# Patient Record
Sex: FEMALE | Race: WHITE | Employment: FULL TIME | URBAN - METROPOLITAN AREA
[De-identification: names, ages, dates, MRNs, and addresses within clinical notes are randomized per-mention and may not be internally consistent; named-entity substitution may affect disease eponyms.]

---

## 2019-10-02 LAB
ALBUMIN: 4 G/DL (ref 3.4–5)
ALP BLD-CCNC: 56 U/L (ref 33–110)
ALT SERPL-CCNC: 14 U/L (ref 7–45)
ANION GAP SERPL CALCULATED.3IONS-SCNC: 13 MMOL/L (ref 10–20)
AST SERPL-CCNC: 13 U/L (ref 9–39)
BICARBONATE: 23 MMOL/L (ref 21–32)
BILIRUB SERPL-MCNC: 0.4 MG/DL (ref 0–1.2)
BILIRUBIN DIRECT: 0.1 MG/DL (ref 0–0.3)
CALCIUM SERPL-MCNC: 9 MG/DL (ref 8.6–10.3)
CHLORIDE BLD-SCNC: 103 MMOL/L (ref 98–107)
CHOLESTEROL/HDL RATIO: 5.2
CHOLESTEROL: 177 MG/DL (ref 0–199)
CREAT SERPL-MCNC: 0.87 MG/DL (ref 0.5–1)
ERYTHROCYTE [DISTWIDTH] IN BLOOD BY AUTOMATED COUNT: 18 % (ref 11.5–14)
ESTIMATED AVERAGE GLUCOSE: 105 MG/DL
GFR AFRICAN AMERICAN: >60 ML/MIN/1.73M2
GFR NON-AFRICAN AMERICAN: >60 ML/MIN/1.73M2
GLUCOSE: 82 MG/DL (ref 74–99)
HBA1C MFR BLD: 5.3 %
HCT VFR BLD CALC: 34.7 % (ref 36–46)
HDLC SERPL-MCNC: 34 MG/DL
HEMOGLOBIN: 10.1 G/DL (ref 12–16)
LDL CHOLESTEROL: 89 MG/DL (ref 0–99)
MAGNESIUM: 1.9 MG/DL (ref 1.6–2.4)
MCHC RBC AUTO-ENTMCNC: 29.1 G/DL (ref 32–36)
MCV RBC AUTO: 78 FL (ref 80–100)
NON-HDL CHOLESTEROL: 143 MG/DL
PLATELET # BLD: 314 X10E9/L (ref 150–450)
POTASSIUM SERPL-SCNC: 4.3 MMOL/L (ref 3.5–5.3)
RBC # BLD: 4.44 X10E12/L (ref 4–5.2)
SODIUM BLD-SCNC: 135 MMOL/L (ref 136–145)
TOTAL PROTEIN: 7.3 G/DL (ref 6.4–8.2)
TRIGL SERPL-MCNC: 270 MG/DL (ref 0–149)
UREA NITROGEN: 12 MG/DL (ref 6–23)
VLDLC SERPL CALC-MCNC: 54 MG/DL (ref 0–40)
WBC: 7.9 X10E9/L (ref 4.4–11.3)

## 2021-06-23 ENCOUNTER — HOSPITAL ENCOUNTER (OUTPATIENT)
Dept: WOMENS IMAGING | Age: 46
Discharge: HOME | End: 2021-06-25
Payer: MEDICARE

## 2021-06-23 ENCOUNTER — HOSPITAL ENCOUNTER (OUTPATIENT)
Dept: ULTRASOUND IMAGING | Age: 46
Discharge: HOME | End: 2021-06-25
Payer: MEDICARE

## 2021-06-23 DIAGNOSIS — Z12.31 SCREENING MAMMOGRAM FOR HIGH-RISK PATIENT: ICD-10-CM

## 2021-06-23 DIAGNOSIS — N92.0 MENORRHAGIA WITH REGULAR CYCLE: ICD-10-CM

## 2021-06-23 PROCEDURE — 76856 US EXAM PELVIC COMPLETE: CPT

## 2021-06-23 PROCEDURE — 76830 TRANSVAGINAL US NON-OB: CPT

## 2021-06-23 PROCEDURE — 77063 BREAST TOMOSYNTHESIS BI: CPT

## 2021-10-15 ENCOUNTER — HOSPITAL ENCOUNTER (OUTPATIENT)
Dept: WOMENS IMAGING | Age: 46
Discharge: HOME | End: 2021-10-17
Payer: MEDICARE

## 2021-10-15 ENCOUNTER — APPOINTMENT (OUTPATIENT)
Dept: ULTRASOUND IMAGING | Age: 46
End: 2021-10-15
Payer: MEDICARE

## 2021-10-15 DIAGNOSIS — R92.8 ABNORMAL MAMMOGRAM: ICD-10-CM

## 2021-10-15 PROCEDURE — G0279 TOMOSYNTHESIS, MAMMO: HCPCS

## 2022-01-17 ENCOUNTER — HOSPITAL ENCOUNTER (INPATIENT)
Age: 47
LOS: 3 days | Discharge: HOME | DRG: 751 | End: 2022-01-20
Attending: PSYCHIATRY & NEUROLOGY | Admitting: PSYCHIATRY & NEUROLOGY
Payer: MEDICARE

## 2022-01-17 DIAGNOSIS — T14.91XA SUICIDE ATTEMPT (HCC): Primary | ICD-10-CM

## 2022-01-17 DIAGNOSIS — S61.512A WRIST LACERATION, LEFT, INITIAL ENCOUNTER: ICD-10-CM

## 2022-01-17 PROBLEM — F32.9 MAJOR DEPRESSION, SINGLE EPISODE: Status: ACTIVE | Noted: 2022-01-17

## 2022-01-17 LAB
ACETAMINOPHEN LEVEL: <5 UG/ML (ref 10–30)
ALBUMIN SERPL-MCNC: 4.1 G/DL (ref 3.5–4.6)
ALP BLD-CCNC: 63 U/L (ref 40–130)
ALT SERPL-CCNC: 19 U/L (ref 0–33)
AMPHETAMINE SCREEN, URINE: NORMAL
ANION GAP SERPL CALCULATED.3IONS-SCNC: 10 MEQ/L (ref 9–15)
AST SERPL-CCNC: 20 U/L (ref 0–35)
BARBITURATE SCREEN URINE: NORMAL
BASOPHILS ABSOLUTE: 0 K/UL (ref 0–0.2)
BASOPHILS RELATIVE PERCENT: 0.6 %
BENZODIAZEPINE SCREEN, URINE: NORMAL
BILIRUB SERPL-MCNC: 0.3 MG/DL (ref 0.2–0.7)
BILIRUBIN URINE: NEGATIVE
BLOOD, URINE: NEGATIVE
BUN BLDV-MCNC: 9 MG/DL (ref 6–20)
CALCIUM SERPL-MCNC: 9.6 MG/DL (ref 8.5–9.9)
CANNABINOID SCREEN URINE: NORMAL
CHLORIDE BLD-SCNC: 102 MEQ/L (ref 95–107)
CHOLESTEROL, TOTAL: 145 MG/DL (ref 0–199)
CLARITY: CLEAR
CO2: 25 MEQ/L (ref 20–31)
COCAINE METABOLITE SCREEN URINE: NORMAL
COLOR: YELLOW
CREAT SERPL-MCNC: 0.68 MG/DL (ref 0.5–0.9)
EOSINOPHILS ABSOLUTE: 0.1 K/UL (ref 0–0.7)
EOSINOPHILS RELATIVE PERCENT: 1.3 %
ETHANOL PERCENT: NORMAL G/DL
ETHANOL: <10 MG/DL (ref 0–0.08)
GFR AFRICAN AMERICAN: >60
GFR NON-AFRICAN AMERICAN: >60
GLOBULIN: 3.3 G/DL (ref 2.3–3.5)
GLUCOSE BLD-MCNC: 101 MG/DL (ref 70–99)
GLUCOSE URINE: NEGATIVE MG/DL
HCT VFR BLD CALC: 35.3 % (ref 37–47)
HDLC SERPL-MCNC: 43 MG/DL (ref 40–59)
HEMOGLOBIN: 11.6 G/DL (ref 12–16)
KETONES, URINE: ABNORMAL MG/DL
LDL CHOLESTEROL CALCULATED: 76 MG/DL (ref 0–129)
LEUKOCYTE ESTERASE, URINE: NEGATIVE
LYMPHOCYTES ABSOLUTE: 1.7 K/UL (ref 1–4.8)
LYMPHOCYTES RELATIVE PERCENT: 25.3 %
Lab: NORMAL
MCH RBC QN AUTO: 27.7 PG (ref 27–31.3)
MCHC RBC AUTO-ENTMCNC: 32.9 % (ref 33–37)
MCV RBC AUTO: 84.3 FL (ref 82–100)
METHADONE SCREEN, URINE: NORMAL
MONOCYTES ABSOLUTE: 0.4 K/UL (ref 0.2–0.8)
MONOCYTES RELATIVE PERCENT: 5.7 %
NEUTROPHILS ABSOLUTE: 4.5 K/UL (ref 1.4–6.5)
NEUTROPHILS RELATIVE PERCENT: 67.1 %
NITRITE, URINE: NEGATIVE
OPIATE SCREEN URINE: NORMAL
OXYCODONE URINE: NORMAL
PDW BLD-RTO: 13.8 % (ref 11.5–14.5)
PH UA: 6.5 (ref 5–9)
PHENCYCLIDINE SCREEN URINE: NORMAL
PLATELET # BLD: 244 K/UL (ref 130–400)
POTASSIUM SERPL-SCNC: 3.6 MEQ/L (ref 3.4–4.9)
PROPOXYPHENE SCREEN: NORMAL
PROTEIN UA: NEGATIVE MG/DL
RBC # BLD: 4.18 M/UL (ref 4.2–5.4)
SALICYLATE, SERUM: <0.3 MG/DL (ref 15–30)
SARS-COV-2, NAAT: NOT DETECTED
SODIUM BLD-SCNC: 137 MEQ/L (ref 135–144)
SPECIFIC GRAVITY UA: 1.01 (ref 1–1.03)
TOTAL CK: 58 U/L (ref 0–170)
TOTAL PROTEIN: 7.4 G/DL (ref 6.3–8)
TRIGL SERPL-MCNC: 130 MG/DL (ref 0–150)
TSH SERPL DL<=0.05 MIU/L-ACNC: 1.16 UIU/ML (ref 0.44–3.86)
URINE REFLEX TO CULTURE: ABNORMAL
UROBILINOGEN, URINE: 1 E.U./DL
WBC # BLD: 6.7 K/UL (ref 4.8–10.8)

## 2022-01-17 PROCEDURE — 87635 SARS-COV-2 COVID-19 AMP PRB: CPT

## 2022-01-17 PROCEDURE — 82550 ASSAY OF CK (CPK): CPT

## 2022-01-17 PROCEDURE — 82077 ASSAY SPEC XCP UR&BREATH IA: CPT

## 2022-01-17 PROCEDURE — 80307 DRUG TEST PRSMV CHEM ANLYZR: CPT

## 2022-01-17 PROCEDURE — 6370000000 HC RX 637 (ALT 250 FOR IP): Performed by: PHYSICIAN ASSISTANT

## 2022-01-17 PROCEDURE — 99285 EMERGENCY DEPT VISIT HI MDM: CPT

## 2022-01-17 PROCEDURE — 85025 COMPLETE CBC W/AUTO DIFF WBC: CPT

## 2022-01-17 PROCEDURE — 84443 ASSAY THYROID STIM HORMONE: CPT

## 2022-01-17 PROCEDURE — 80061 LIPID PANEL: CPT

## 2022-01-17 PROCEDURE — 1240000000 HC EMOTIONAL WELLNESS R&B

## 2022-01-17 PROCEDURE — 80053 COMPREHEN METABOLIC PANEL: CPT

## 2022-01-17 PROCEDURE — 0HQEXZZ REPAIR LEFT LOWER ARM SKIN, EXTERNAL APPROACH: ICD-10-PCS | Performed by: PSYCHIATRY & NEUROLOGY

## 2022-01-17 PROCEDURE — 6370000000 HC RX 637 (ALT 250 FOR IP): Performed by: PSYCHIATRY & NEUROLOGY

## 2022-01-17 PROCEDURE — 80179 DRUG ASSAY SALICYLATE: CPT

## 2022-01-17 PROCEDURE — 36415 COLL VENOUS BLD VENIPUNCTURE: CPT

## 2022-01-17 PROCEDURE — 80143 DRUG ASSAY ACETAMINOPHEN: CPT

## 2022-01-17 PROCEDURE — 81003 URINALYSIS AUTO W/O SCOPE: CPT

## 2022-01-17 RX ORDER — IBUPROFEN 800 MG/1
800 TABLET ORAL ONCE
Status: COMPLETED | OUTPATIENT
Start: 2022-01-17 | End: 2022-01-17

## 2022-01-17 RX ORDER — HYDROXYZINE PAMOATE 50 MG/1
50 CAPSULE ORAL EVERY 6 HOURS PRN
Status: DISCONTINUED | OUTPATIENT
Start: 2022-01-17 | End: 2022-01-20 | Stop reason: HOSPADM

## 2022-01-17 RX ORDER — HYDROXYZINE HYDROCHLORIDE 50 MG/ML
50 INJECTION, SOLUTION INTRAMUSCULAR EVERY 6 HOURS PRN
Status: DISCONTINUED | OUTPATIENT
Start: 2022-01-17 | End: 2022-01-20 | Stop reason: HOSPADM

## 2022-01-17 RX ORDER — ACETAMINOPHEN 325 MG/1
650 TABLET ORAL EVERY 4 HOURS PRN
Status: DISCONTINUED | OUTPATIENT
Start: 2022-01-17 | End: 2022-01-20 | Stop reason: HOSPADM

## 2022-01-17 RX ORDER — POLYETHYLENE GLYCOL 3350 17 G/17G
17 POWDER, FOR SOLUTION ORAL DAILY PRN
Status: DISCONTINUED | OUTPATIENT
Start: 2022-01-17 | End: 2022-01-20 | Stop reason: HOSPADM

## 2022-01-17 RX ORDER — HALOPERIDOL 5 MG/ML
5 INJECTION INTRAMUSCULAR EVERY 6 HOURS PRN
Status: DISCONTINUED | OUTPATIENT
Start: 2022-01-17 | End: 2022-01-20 | Stop reason: HOSPADM

## 2022-01-17 RX ORDER — HALOPERIDOL 5 MG
5 TABLET ORAL EVERY 6 HOURS PRN
Status: DISCONTINUED | OUTPATIENT
Start: 2022-01-17 | End: 2022-01-20 | Stop reason: HOSPADM

## 2022-01-17 RX ORDER — TRAZODONE HYDROCHLORIDE 50 MG/1
50 TABLET ORAL NIGHTLY PRN
Status: DISCONTINUED | OUTPATIENT
Start: 2022-01-17 | End: 2022-01-20 | Stop reason: HOSPADM

## 2022-01-17 RX ADMIN — IBUPROFEN 800 MG: 800 TABLET, FILM COATED ORAL at 17:57

## 2022-01-17 RX ADMIN — TRAZODONE HYDROCHLORIDE 50 MG: 50 TABLET ORAL at 22:14

## 2022-01-17 ASSESSMENT — PATIENT HEALTH QUESTIONNAIRE - PHQ9
SUM OF ALL RESPONSES TO PHQ QUESTIONS 1-9: 23
SUM OF ALL RESPONSES TO PHQ QUESTIONS 1-9: 24

## 2022-01-17 ASSESSMENT — SLEEP AND FATIGUE QUESTIONNAIRES
DIFFICULTY FALLING ASLEEP: YES
DIFFICULTY ARISING: NO
DO YOU HAVE DIFFICULTY SLEEPING: YES
AVERAGE NUMBER OF SLEEP HOURS: 4
DIFFICULTY STAYING ASLEEP: YES
RESTFUL SLEEP: YES
DO YOU USE A SLEEP AID: NO

## 2022-01-17 ASSESSMENT — ENCOUNTER SYMPTOMS
COLOR CHANGE: 0
SHORTNESS OF BREATH: 0
ABDOMINAL DISTENTION: 0
COUGH: 0
CHOKING: 0
RHINORRHEA: 0
EYE DISCHARGE: 0
SORE THROAT: 0
ABDOMINAL PAIN: 0
CONSTIPATION: 0

## 2022-01-17 ASSESSMENT — LIFESTYLE VARIABLES: HISTORY_ALCOHOL_USE: NO

## 2022-01-17 ASSESSMENT — PAIN SCALES - GENERAL
PAINLEVEL_OUTOF10: 9
PAINLEVEL_OUTOF10: 9

## 2022-01-17 NOTE — ED PROVIDER NOTES
[facility]  eMERGENCY dEPARTMENT eNCOUnter      Pt Name: Allison Jordan  MRN: [id number]  [place] 1975  Date of evaluation: 1/17/2022  Provider: Lyn Tay PA-C    CHIEF COMPLAINT       Chief Complaint   Patient presents with    Suicidal     patient sliced left wrist attempted suicide. HISTORY OF PRESENT ILLNESS   (Location/Symptom, Timing/Onset,Context/Setting, Quality, Duration, Modifying Factors, Severity)  Note limiting factors. Allison Jordan is a 42 y.o. female who presents to the emergency department with a complaint of suicide attempt. Per Oliverio please officer patient's  came home, found her with a razor knife, she had cut her left wrist.  She states that she has been under a lot of stress lately, but would not elaborate.  took a towel placed around her wrist, and contacted 911. Patient denies any other coingestions, or any other attempts at harming herself. Patient pink slipped by [facility]    HPI    NursingNotes were reviewed. REVIEW OF SYSTEMS    (2-9 systems for level 4, 10 or more for level 5)     Review of Systems   Constitutional: Negative for activity change, appetite change, fatigue and fever. HENT: Negative for congestion, ear discharge, ear pain, nosebleeds, rhinorrhea and sore throat. Eyes: Negative for discharge. Respiratory: Negative for cough, choking and shortness of breath. Cardiovascular: Negative for chest pain, palpitations and leg swelling. Gastrointestinal: Negative for abdominal distention, abdominal pain and constipation. Genitourinary: Negative for difficulty urinating and dysuria. Musculoskeletal: Negative for arthralgias. Skin: Negative for color change, pallor, rash and wound. Neurological: Negative for dizziness, syncope, numbness and headaches. Psychiatric/Behavioral: Positive for suicidal ideas. Negative for agitation and confusion.        Except as noted above the remainder of the review of systems was reviewed and negative.        PAST MEDICAL HISTORY     Past Medical History:   Diagnosis Date    Chronic back pain     Ectopic pregnancy     2002    Major depression, single episode 2022         SURGICALHISTORY       Past Surgical History:   Procedure Laterality Date    LAPAROSCOPY      Ruptured ectopic right/ right salpingectomy 2002    TUBAL LIGATION           CURRENT MEDICATIONS       Current Discharge Medication List      CONTINUE these medications which have NOT CHANGED    Details   ferrous sulfate (FE TABS) 325 (65 FE) MG EC tablet Take 1 tablet by mouth daily (with breakfast)  Qty: 90 tablet, Refills: 3      medroxyPROGESTERone (PROVERA) 10 MG tablet Take 1 tablet by mouth daily  Qty: 30 tablet, Refills: 0             ALLERGIES     Codeine    FAMILY HISTORY       Family History   Problem Relation Age of Onset    Heart Disease Mother     High Blood Pressure Mother     Diabetes Mother     Kidney Disease Mother     Cancer Father     Breast Cancer Neg Hx     Colon Cancer Neg Hx     Eclampsia Neg Hx     Hypertension Neg Hx     Ovarian Cancer Neg Hx      Labor Neg Hx     Spont Abortions Neg Hx     Stroke Neg Hx           SOCIAL HISTORY       Social History     Socioeconomic History    Marital status:      Spouse name: None    Number of children: None    Years of education: None    Highest education level: None   Occupational History    None   Tobacco Use    Smoking status: Never Smoker    Smokeless tobacco: None   Substance and Sexual Activity    Alcohol use: No     Alcohol/week: 0.0 standard drinks    Drug use: None    Sexual activity: Yes     Partners: Male     Comment: Tubal ligation w/ cs   Other Topics Concern    None   Social History Narrative    None     Social Determinants of Health     Financial Resource Strain:     Difficulty of Paying Living Expenses: Not on file   Food Insecurity:     Worried About Running Out of Food in the Last Year: Not on file    [address] in the Last Year: Not on file   Transportation Needs:     Lack of Transportation (Medical): Not on file    Lack of Transportation (Non-Medical): Not on file   Physical Activity:     Days of Exercise per Week: Not on file    Minutes of Exercise per Session: Not on file   Stress:     Feeling of Stress : Not on file   Social Connections:     Frequency of Communication with Friends and Family: Not on file    Frequency of Social Gatherings with Friends and Family: Not on file    Attends Yazidi Services: Not on file    Active Member of [address] [place] [address] [employer] or Organizations: Not on file    Attends Club or Organization Meetings: Not on file    Marital Status: Not on file   Intimate Partner Violence:     Fear of Current or Ex-Partner: Not on file    Emotionally Abused: Not on file    Physically Abused: Not on file    Sexually Abused: Not on file   Housing Stability:     Unable to Pay for Housing in the Last Year: Not on file    Number of [place] in the Last Year: Not on file    Unstable Housing in the Last Year: Not on file       SCREENINGS    Brianna Coma Scale  Eye Opening: Spontaneous  Best Verbal Response: Oriented  Best Motor Response: Obeys commands  Brianna Coma Scale Score: 15 @FLOW([id number])@      PHYSICAL EXAM    (up to 7 for level 4, 8 or more for level 5)     ED Triage Vitals [01/17/22 1214]   BP Temp Temp src Pulse Resp SpO2 Height Weight   128/87 98.1 °F (36.7 °C) -- 80 16 99 % -- --       Physical Exam  Vitals and nursing note reviewed. Constitutional:       General: She is not in acute distress. Appearance: She is well-developed. She is not ill-appearing, toxic-appearing or diaphoretic. HENT:      Head: Normocephalic. Right Ear: Tympanic membrane normal.      Left Ear: Tympanic membrane normal.      Nose: No congestion. Mouth/Throat:      Mouth: Mucous membranes are moist.      Pharynx: No oropharyngeal exudate or posterior oropharyngeal erythema. Eyes:      Extraocular Movements: Extraocular movements intact. Conjunctiva/sclera: Conjunctivae normal.      Pupils: Pupils are equal, round, and reactive to light. Neck:      Vascular: No JVD. Trachea: No tracheal deviation. Cardiovascular:      Rate and Rhythm: Normal rate. Pulses: Normal pulses. Heart sounds: Normal heart sounds. No murmur heard. No friction rub. No gallop. Pulmonary:      Effort: Pulmonary effort is normal. No tachypnea, accessory muscle usage, respiratory distress or retractions. Breath sounds: No stridor. No wheezing, rhonchi or rales. Chest:      Chest wall: No tenderness. Abdominal:      General: Abdomen is flat. Bowel sounds are normal. There is no distension or abdominal bruit. Palpations: There is no shifting dullness, fluid wave, hepatomegaly, splenomegaly, mass or pulsatile mass. Tenderness: There is no abdominal tenderness. There is no right CVA tenderness, left CVA tenderness, guarding or rebound. Negative signs include Hernandez's sign, Rovsing's sign and McBurney's sign. Musculoskeletal:         General: No deformity. Cervical back: Normal range of motion and neck supple. No rigidity. Skin:     General: Skin is warm and dry. Capillary Refill: Capillary refill takes less than 2 seconds. Coloration: Skin is not jaundiced. Comments: 2.0 cm laceration transversely across the left volar wrist no active bleeding at time of evaluation. Moving fingers well. Good sensation. Radial pulses are present. Capillary refill within 3 seconds. Neurological:      General: No focal deficit present. Mental Status: She is alert and oriented to person, place, and time. Mental status is at baseline. Cranial Nerves: No cranial nerve deficit. Sensory: No sensory deficit. Motor: No weakness.       Coordination: Coordination normal.   Psychiatric:         Mood and Affect: Mood normal.         DIAGNOSTIC RESULTS     EKG: All EKG's are interpreted by the Emergency Department Physician who either signs or Co-signsthis chart in the absence of a cardiologist.        RADIOLOGY:   Rollene Saver such as CT, Ultrasound and MRI are read by the radiologist. Plain radiographic images are visualized and preliminarily interpreted by the emergency physician with the below findings:        Interpretation per the Radiologist below, if available at the time ofthis note:    No orders to display         ED BEDSIDE ULTRASOUND:   Performed by ED Physician - none    LABS:  Labs Reviewed   COMPREHENSIVE METABOLIC PANEL - Abnormal; Notable for the following components:       Result Value    Glucose 101 (*)     All other components within normal limits   CBC WITH AUTO DIFFERENTIAL - Abnormal; Notable for the following components:    RBC 4.18 (*)     Hemoglobin 11.6 (*)     Hematocrit 35.3 (*)     MCHC 32.9 (*)     All other components within normal limits   URINE RT REFLEX TO CULTURE - Abnormal; Notable for the following components:    Ketones, Urine TRACE (*)     All other components within normal limits   SALICYLATE LEVEL - Abnormal; Notable for the following components:    Salicylate, Serum <6.8 (*)     All other components within normal limits   ACETAMINOPHEN LEVEL - Abnormal; Notable for the following components:    Acetaminophen Level <5 (*)     All other components within normal limits   COVID-19, RAPID   URINE DRUG SCREEN   ETHANOL   CK   TSH WITHOUT REFLEX   LIPID PANEL       All other labs were within normal range or not returned as of this dictation.     EMERGENCY DEPARTMENT COURSE and DIFFERENTIAL DIAGNOSIS/MDM:   Vitals:    Vitals:    01/17/22 1214 01/17/22 2202 01/17/22 2210   BP: 128/87  119/82   Pulse: 80  89   Resp: 16 18 18   Temp: 98.1 °F (36.7 °C) 98.1 °F (36.7 °C) 98.1 °F (36.7 °C)   TempSrc:   Oral   SpO2: 99%  98%            MDM  Number of Diagnoses or Management Options  Suicide attempt (HCC)  Wrist laceration, left, initial encounter  Diagnosis management comments: Patient is medically clear    Patient admitted to 1 W, suicidal attempt, depression      CRITICAL CARE TIME   Total Critical Care time was 0 minutes, excluding separately reportableprocedures. There was a high probability of clinicallysignificant/life threatening deterioration in the patient's condition which required my urgent intervention. CONSULTS:  IP CONSULT TO HOSPITALIST    PROCEDURES:  Unless otherwise noted below, none     Lac Repair    Date/Time: 1/17/2022 2:51 PM  Performed by: Kristeen Holstein, PA-C  Authorized by: Kristeen Holstein, PA-C     Consent:     Consent obtained:  Verbal    Consent given by:  Patient    Risks discussed:  Infection, pain, poor cosmetic result, need for additional repair and poor wound healing    Alternatives discussed:  No treatment  Anesthesia (see MAR for exact dosages): Anesthesia method:  None  Laceration details:     Location:  Shoulder/arm    Shoulder/arm location:  L lower arm    Length (cm):  2    Depth (mm):  1  Repair type:     Repair type:  Simple  Treatment:     Area cleansed with:  Betadine    Amount of cleaning:  Standard    Visualized foreign bodies/material removed: no    Skin repair:     Repair method:  Steri-Strips    Number of Steri-Strips:  2  Approximation:     Approximation:  Loose  Post-procedure details:     Dressing:  Non-adherent dressing        FINAL IMPRESSION      1. Suicide attempt ([address]    2. Wrist laceration, left, initial encounter          DISPOSITION/PLAN   DISPOSITION Admitted 01/17/2022 05:51:59 PM      PATIENT REFERRED TO:  No follow-up provider specified.     DISCHARGE MEDICATIONS:  Current Discharge Medication List             (Please note that portions of this note were completed with a voice recognition program.  Efforts were made to edit the dictations but occasionally words are mis-transcribed.)    Kristeen Holstein, PA-C (electronically signed)  Attending Emergency Physician         Renan Del Rosario PA-C  01/18/22 9312

## 2022-01-17 NOTE — PROGRESS NOTES
Patient arrived to unit via wheelchair accompanied by staff. Patient ambulated to assigned room with steady gait. Skin assessment and contraband check complete by this nurse. Pt has laceration to left wrist that is covered with bandage at this time. Skin otherwise intact. No contraband found. Patient on 1:1 for safety. Pt given hygiene supplies and fluids.

## 2022-01-17 NOTE — ED NOTES
Report given to [facility]. Patient to 390.      Lew Davis RN  01/17/22 3882

## 2022-01-17 NOTE — PROGRESS NOTES
1:1 started with the patient. Pt asked if she was allowed to use the phone &  aboutthe visitation times.
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
## 2022-01-17 NOTE — ED TRIAGE NOTES
Patient presents to the ED after her  found her cutting her left wrist with a razor in attempt to end her life. Patient denies SI, HI, AVH at this time.

## 2022-01-17 NOTE — PROGRESS NOTES
Report received from [facility] from [facility]. Patient is coming to unit on a pink slip with a diagnosis of major depressive disorder. Patient brought to ED by  after suicide attempt of cutting left wrist. Patient states that she is going through a lot at this time causing her to be depressed. Patient lost her mother 1 year ago due to [place]. Patient is also having a difficult time with her daughter who recently turned 25 because her daughter and  do not get along. Patient states she is stuck in a hard place choosing between her  and her child. Patient presents depressed, hopeless and helpless. Affect is flat. Fails to make eye contact with this writer during assessment. Speech is excessively soft. Patient denies SI, HI, AVH at this time.  Tox (-) Covid (-) Electronically signed by Trini Meza RN on 1/17/22 at 6:14 PM EST

## 2022-01-17 NOTE — ED NOTES
Patient remains in ED until staff is able to accommodate 1:1 for patient safety.      Mita Alejandra RN  01/17/22 2128

## 2022-01-17 NOTE — ED NOTES
Bed:  [facility]  Expected date: 1/17/22  Expected time: 11:56 AM  Means of arrival: Ambulance  Comments:  46F SI, laceration to left wrist. VSS 18 RAC      Gracie Ledezma RN  01/17/22 6077

## 2022-01-18 LAB — HCG(URINE) PREGNANCY TEST: NEGATIVE

## 2022-01-18 PROCEDURE — 84703 CHORIONIC GONADOTROPIN ASSAY: CPT

## 2022-01-18 PROCEDURE — 1240000000 HC EMOTIONAL WELLNESS R&B

## 2022-01-18 PROCEDURE — 6370000000 HC RX 637 (ALT 250 FOR IP): Performed by: PSYCHIATRY & NEUROLOGY

## 2022-01-18 PROCEDURE — 99223 1ST HOSP IP/OBS HIGH 75: CPT | Performed by: PSYCHIATRY & NEUROLOGY

## 2022-01-18 RX ORDER — MIRTAZAPINE 15 MG/1
15 TABLET, FILM COATED ORAL NIGHTLY
Status: DISCONTINUED | OUTPATIENT
Start: 2022-01-18 | End: 2022-01-20 | Stop reason: HOSPADM

## 2022-01-18 RX ADMIN — MIRTAZAPINE 15 MG: 15 TABLET, FILM COATED ORAL at 21:20

## 2022-01-18 RX ADMIN — TRAZODONE HYDROCHLORIDE 50 MG: 50 TABLET ORAL at 21:21

## 2022-01-18 ASSESSMENT — LIFESTYLE VARIABLES: HISTORY_ALCOHOL_USE: NO
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
## 2022-01-18 NOTE — GROUP NOTE
Group Therapy Note    Date: 1/18/2022    Group Start Time: 2282  Group End Time: 8798  Group Topic: Healthy Living/Wellness    [facility]    Tod Monday        Group Therapy Note    Attendees: 7/20         Patient's Goal:  To learn about nutrition and healthy eating. Notes:  Patient participated in group discussion. Patient speaks very softly.     Status After Intervention:  Unchanged    Participation Level: Interactive    Participation Quality: Appropriate and Attentive      Speech:  soft      Thought Process/Content: Logical      Affective Functioning: Flat      Mood: depressed      Level of consciousness:  Alert and Attentive      Response to Learning: Able to verbalize current knowledge/experience      Endings: None Reported    Modes of Intervention: Education      Discipline Responsible: [address], [employer]      Signature:  Tod Monday

## 2022-01-18 NOTE — H&P
[address] [place] [address] - Department of Psychiatry    History and Physical - Adult         CHIEF COMPLAINT:  Depression SA    History obtained from:  patient    Patient was seen after discussing with the treatment team and reviewing the chart        CIRCUMSTANCES OF ADMISSION:     Patient brought to ED by  after suicide attempt of cutting left wrist. Patient states that she is going through a lot at this time causing her to be depressed. Patient lost her mother 1 year ago due to [place]. Patient is also having a difficult time with her daughter who recently turned 25 because her daughter and  do not jose along. Patient states she is stuck in a hard place choosing between her  and her child. Patient presents depressed, hopeless and helpless. HISTORY OF PRESENT ILLNESS:      The patient is a 42 y.o. female  with 3 adult children with no significant past history. Patient is living in [facility] with her  of 13 years. Patient is currently employed full time at [employer] in [facility] [employer] [facility] [employer] [facility]. Pt has been feeling  depressed and anxious past year. Mom passed away Dec 2020 from [employer] 45 continue to have lot of guilt about her death  Stressors:daughter just turned 25 and because she is not getting along with her step father, she was kicked out. States it is hard to choose between her  and her child. Patient states that work is stressful. Currently going through a theft case at previous place of employment. Depression Severity: Rating mood to be around 2/10 (10- good)  Quality:melancholic  Worse in the morning  Content: Hopeless, worthless and helpless feeling  Suicidal thoughts - Pt cut her left wrist with  yesterday.   She tried to talk to her  before that attempt and he thought that she would get over it  Associated symptoms:  Poor concentration, anhedonia, decrease motivation  Sleep and appetite- poor      Family: Musa Isabel states that his wife has been very depressed lately. States that she lost her mother a year ago due to [place] and is still grieving the loss of her mother. 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 States patient is not elaborating much on the details or severity of the case. States that the patient is having troubles with her daughter who is 25 but does not elaborate. States that the patient rarely goes to [employer] but went to [employer] 1/16 which he felt was very odd. States that the patient socially drinks on weekends but has been drinking beer almost daily upon getting home from her new place of employment. States that the patient said she can't get her brain to shut off and stated that she needed a psychiatric evaluation. The patient is not currently receiving care for the above psychiatric illness.     Medications Prior to Admission:   Medications Prior to Admission: ferrous sulfate (FE TABS) 325 (65 FE) MG EC tablet, Take 1 tablet by mouth daily (with breakfast)  medroxyPROGESTERone (PROVERA) 10 MG tablet, Take 1 tablet by mouth daily    Compliance:n/a    Psychiatric Review of Systems       Depression: yes     Loree or Hypomania:  no     Panic Attacks:  yes      Phobias:  no     Obsessions and Compulsions:  no     PTSD : no     Hallucinations:  no     Delusions:  no    Substance Abuse History:  ETOH: Pt started drinking daily 2 weeks ago, few beers after work  Normally drink over weekend  Drink to sleep and shut down her mind   Marijuana: no  Opiates: no  Other Drugs: no      Past Psychiatric History:  Prior Diagnosis:  NO  Psychiatrist: no  Therapist:no  Hospitalization: no  Hx of Suicidal Attempts: no  Hx of violence:  no  ECT: no  Previous discontinued Psychiatric Med Trials: no    Past Medical History:        Diagnosis Date    Chronic back pain     Ectopic pregnancy     2002    Major depression, single episode 1/17/2022       Past Surgical History:        Procedure Laterality Date    LAPAROSCOPY      Ruptured ectopic right/ right salpingectomy 2002    TUBAL LIGATION         Allergies:   Codeine    Family History  Family History   Problem Relation Age of Onset    Heart Disease Mother     High Blood Pressure Mother     Diabetes Mother     Kidney Disease Mother     Cancer Father     Breast Cancer Neg Hx     Colon Cancer Neg Hx     Eclampsia Neg Hx     Hypertension Neg Hx     Ovarian Cancer Neg Hx      Labor Neg Hx     Spont Abortions Neg Hx     Stroke Neg Hx          Social History:  Born and Raised: [place]  Describes Childhood:   supportive  Education: College  Employment: Employed part time  Relationships:   Children: 3  Current Support: romantic partner    Legal Hx: Patient arrested for disorderly conduct in  and [address] [place], [place] [address] in . Access to weapons?:  No      EXAMINATION:    REVIEW OF SYSTEMS:    ROS:  [x] All negative/unchanged except if checked.  Explain positive(checked items) below:  [] Constitutional  [] Eyes  [] Ear/Nose/Mouth/Throat  [] Respiratory  [] CV  [] GI  []   [] Musculoskeletal  [] Skin/Breast  [] Neurological  [] Endocrine  [] Heme/Lymph  [] Allergic/Immunologic    Explanation:     Vitals:  /70   Pulse 111   Temp 98 °F (36.7 °C) (Oral)   Resp 18   SpO2 99%      Neurologic Exam:   Muscle Strength & Tone: full ROM, normal  Gait: normal gait   Involuntary Movements: No    Mental Status Examination:    Level of consciousness:  within normal limits   Appearance:  ill-appearing  Behavior/Motor:  psychomotor retardation  Attitude toward examiner:  cooperative  Speech:  slow   Mood: constricted, decreased range and depressed  Affect:  anxious  Thought processes:  slow   Thought content:  Suicidal Ideation:  passive  Delusions:  no evidence of delusions  Perceptual Disturbance:  denies any perceptual disturbance  Cognition:  oriented to person, place, and time   Concentration distractible  Memory intact  Insight poor   Judgement poor   Fund of Knowledge limited    Mini Mental Status 30/30      DIAGNOSIS:    MDD Single episode severe without psychosis  MARY      RISK ASSESSMENT:    SUICIDE RISK ASSESSMENT: high  HOMICIDE: low  AGITATION/VIOLENCE: low  ELOPEMENT: low    LABS: REVIEWED TODAY:  Recent Labs     01/17/22  1230   WBC 6.7   HGB 11.6*        Recent Labs     01/17/22  1230      K 3.6      CO2 25   BUN 9   CREATININE 0.68   GLUCOSE 101*     Recent Labs     01/17/22  1230   BILITOT 0.3   ALKPHOS 63   AST 20   ALT 19     Lab Results   Component Value Date    LABAMPH Neg 01/17/2022    BARBSCNU Neg 01/17/2022    LABBENZ Neg 01/17/2022    LABMETH Neg 01/17/2022    OPIATESCREENURINE Neg 01/17/2022    PHENCYCLIDINESCREENURINE Neg 01/17/2022    ETOH <10 01/17/2022     Lab Results   Component Value Date    TSH 1.160 01/17/2022     No results found for: LITHIUM  No results found for: VALPROATE, CBMZ  No results found for: LITHIUM, VALPROATE    FURTHER LABS ORDERED :      Radiology   No results found. EKG: TRACING REVIEWED    TREATMENT PLAN:    Risk Management:  close watch and suicide risk    Collateral Information:  Will obtain collateral information from the family or friends. Will obtain medical records as appropriate from out patient providers  Will consult the hospitalist for a physical exam to rule out any co-morbid physical condition. Home medication Reconciled       New Medications started during this admission :    See orders  Prn Haldol 5mg and Vistaril 50mg q6hr for extreme agitation. Trazodone as ordered for insomnia  Vistaril as ordered for anxiety  Discussed with the patient risk, benefit, alternative and common side effects for the  proposed medication treatment. Patient is consenting to the treatment.     Psychotherapy:   Encourage participation in milieu and group therapy  Individual therapy as needed      Electronically signed by Chemo Schuster MD on 1/18/2022 at 10:04 AM

## 2022-01-18 NOTE — PROGRESS NOTES
Patient had a sad affect, was worrisome, anxious but pleasant and cooperative. Patient is depressed and stressed. She stated her  brought her to the hospital because she cut her left wrist as a suicide attempted. Patient was feeling overwhelmed and was unable to cope. Patient is still grieving the passing of her mother. She has some legal issues and some relationship issues. Patient stated her daughter has had some problems and this is causing some problems with her and her . She feels like she is in the middle. She denies any auditory or visual hallucinations. She lives with her . She does have a supportive family. She enjoys watching TV.  Electronically signed by Raj Nichols [address] on 1/18/2022 at 12:37 PM

## 2022-01-18 NOTE — PROGRESS NOTES
Pt asked to use the phone at 6:30. Pt made phone call. Pt used phone from, 6:40 until 8:10. Returned to pt room 1:1 continued.

## 2022-01-18 NOTE — GROUP NOTE
Group Therapy Note    Date: 1/18/2022    Group Start Time: 1400  Group End Time: 1450  Group Topic: Psychoeducation    [facility]    DEISI Adams LSW        Group Therapy Note    Attendees: 10         Patient's Goal:  To participate in a psychoeducational group therapy    Notes:  Patient participated in [facility] group discussion     Status After Intervention:  Unchanged    Participation Level:  Active Listener    Participation Quality: Attentive      Speech:  normal      Thought Process/Content: Logical      Affective Functioning: Flat      Mood: calm      Level of consciousness:  Alert      Response to Learning: Able to verbalize current knowledge/experience      Endings: None Reported    Modes of Intervention: Education      Discipline Responsible: /Counselor      Signature:  DEISI Adams LSW

## 2022-01-18 NOTE — CONSULTS
[facility] MEDICINE    HISTORY AND PHYSICAL EXAM    PATIENT NAME:  Jessika Fuller    MRN:  [id number]  SERVICE DATE:  2022   SERVICE TIME:  12:42 PM    Primary Care Physician: Priscilla Leung DO         SUBJECTIVE  CHIEF COMPLAINT:  Medically appropriate for inpatient psychiatry admission. Consult for medical H/P encounter. HPI:  This is a 42 y.o. female who presents with  PMHx major depression and microcytic anemia presented to emergency room with suicide attempt by cutting L wrist. Reports depression that has progressed since losing her mother last year. Having trouble with daughter and  getting along. Patient cleared from emergency room for psychiatric care. Patient Seen, Chart, Labs, Radiologystudies, and Consults reviewed. Patient denies headache, chest pain, shortness of breath, N/V/D/C, fever/chills.        PAST MEDICAL HISTORY:    Past Medical History:   Diagnosis Date    Chronic back pain     Ectopic pregnancy         Major depression, single episode 2022     PAST SURGICAL HISTORY:    Past Surgical History:   Procedure Laterality Date    LAPAROSCOPY      Ruptured ectopic right/ right salpingectomy 2002    TUBAL LIGATION       FAMILY HISTORY:    Family History   Problem Relation Age of Onset    Heart Disease Mother     High Blood Pressure Mother     Diabetes Mother     Kidney Disease Mother     Cancer Father     Breast Cancer Neg Hx     Colon Cancer Neg Hx     Eclampsia Neg Hx     Hypertension Neg Hx     Ovarian Cancer Neg Hx      Labor Neg Hx     Spont Abortions Neg Hx     Stroke Neg Hx      SOCIAL HISTORY:    Social History     Socioeconomic History    Marital status:      Spouse name: Not on file    Number of children: Not on file    Years of education: Not on file    Highest education level: Not on file   Occupational History    Not on file   Tobacco Use    Smoking status: Never Smoker    Smokeless tobacco: Not on file   Substance and Sexual Activity    Alcohol use: No     Alcohol/week: 0.0 standard drinks    Drug use: Not on file    Sexual activity: Yes     Partners: Male     Comment: Tubal ligation w/ cs   Other Topics Concern    Not on file   Social History Narrative    Not on file     Social Determinants of Health     Financial Resource Strain:     Difficulty of Paying Living Expenses: Not on file   Food Insecurity:     Worried About Running Out of Food in the Last Year: Not on file    Parvin of Food in the Last Year: Not on file   Transportation Needs:     Lack of Transportation (Medical): Not on file    Lack of Transportation (Non-Medical):  Not on file   Physical Activity:     Days of Exercise per Week: Not on file    Minutes of Exercise per Session: Not on file   Stress:     Feeling of Stress : Not on file   Social Connections:     Frequency of Communication with Friends and Family: Not on file    Frequency of Social Gatherings with Friends and Family: Not on file    Attends Protestant Services: Not on file    Active Member of [address] [place] [address] or Organizations: Not on file    Attends Club or Organization Meetings: Not on file    Marital Status: Not on file   Intimate Partner Violence:     Fear of Current or Ex-Partner: Not on file    Emotionally Abused: Not on file    Physically Abused: Not on file    Sexually Abused: Not on file   Housing Stability:     Unable to Pay for Housing in the Last Year: Not on file    Number of [place] in the Last Year: Not on file    Unstable Housing in the Last Year: Not on file     MEDICATIONS:    Current Facility-Administered Medications   Medication Dose Route Frequency Provider Last Rate Last Admin    mirtazapine (REMERON) tablet 15 mg  15 mg Oral Nightly Erica Solares MD        haloperidol (HALDOL) tablet 5 mg  5 mg Oral Q6H PRN Erica Solares MD        Or    haloperidol lactate (HALDOL) injection 5 mg  5 mg IntraMUSCular Q6H PRN Erica Solares MD        hydrOXYzine (VISTARIL) capsule 50 mg  50 mg Oral Q6H PRN Rosemary Knutson MD        Or    hydrOXYzine (VISTARIL) injection 50 mg  50 mg IntraMUSCular Q6H PRN Rosemayr Knutson MD        acetaminophen (TYLENOL) tablet 650 mg  650 mg Oral Q4H PRN Rosemary Knutson MD        polyethylene glycol (GLYCOLAX) packet 17 g  17 g Oral Daily PRN Rosemary Knutson MD        traZODone (DESYREL) tablet 50 mg  50 mg Oral Nightly PRN Rosemary Knutson MD   50 mg at 01/17/22 2214    influenza quadrivalent split vaccine (FLUZONE;FLUARIX;FLULAVAL;AFLURIA) injection 0.5 mL  0.5 mL IntraMUSCular Prior to discharge Teagan Win MD           ALLERGIES: Codeine    REVIEW OF SYSTEM:   ROS as noted in HPI, 12 point ROS reviewed and otherwise negative. OBJECTIVE  PHYSICAL EXAM: /70   Pulse 111   Temp 98 °F (36.7 °C) (Oral)   Resp 18   SpO2 99%   CONSTITUTIONAL:  awake, alert, cooperative, no apparent distress, and appears stated age  EYES:  Lids and lashes normal, conjunctiva normal  ENT:  Normocephalic, without obvious abnormality, atraumatic, sinuses nontender on palpation, external ears without lesions, oral pharynx with moist mucus membranes, tonsils without erythema or exudates, gums normal and good dentition. NECK:  Supple, symmetrical, trachea midline, no adenopathy  LUNGS:  Clear to auscultation bilaterally, no crackles or wheezing  CARDIOVASCULAR: Regular rate and rhythm, normal S1 and S2  ABDOMEN: Normal bowel sounds, soft, non-distended, non-tender  MUSCULOSKELETAL:  There is no redness, warmth, or swelling of the joints. NEUROLOGIC:  Awake, alert, oriented to name, place and time. SKIN:  L wrist lac  DATA:     Diagnostic tests reviewed for today's visit:    Most recent labs and imaging results reviewed.        ASSESSMENT AND PLAN    42 y.o. female with PMH major depression, microcytic anemia admitted to  with the following:    Major depression, single episode  SI  Patient admitted to behavorial health for evaluation and treatment     Laceration L wrist: repaired in ED with steri-strips    Microcytic anemia: H/H stable. No active bleeding    This is only a history and physical examination and not medical management. The patient is to contact and follow up with their primary care physician and go over any abnormal labs, imaging, findings, medical concerns, or conditions that we have and have not addressed during this encounter.     Plan of care discussed with: patient    SIGNATURE: TOMEKA Pathak NP  DATE: January 18, 2022  TIME: 12:42 PM

## 2022-01-18 NOTE — GROUP NOTE
Group Therapy Note    Date: 1/18/2022    Group Start Time: 5562  Group End Time: 0160  Group Topic: Healthy Living/Wellness    [facility]    Sheeba Spencer RN; Jude Cabrera RN        Group Therapy Note    Attendees: 10/21         Patient's Goal:  To attend healthy living group    Notes:  Pt participated    Status After Intervention:  Unchanged    Participation Level:  Active Listener and Interactive    Participation Quality: Appropriate, Attentive and Sharing      Speech:  normal      Thought Process/Content: Logical  Linear      Affective Functioning: Flat      Mood: depressed      Level of consciousness:  Alert, Oriented x4 and Attentive      Response to Learning: Able to retain information      Endings: None Reported    Modes of Intervention: Education      Discipline Responsible: Registered Nurse      Signature:  Sheeba Spencer RN

## 2022-01-18 NOTE — PROGRESS NOTES
Behavioral Services  Medicare Certification Upon Admission    I certify that this patient's inpatient psychiatric hospital admission is medically necessary for:    [x] (1) Treatment which could reasonably be expected to improve this patient's condition,       [x] (2) Or for diagnostic study;     AND     [x](2) The inpatient psychiatric services are provided while the individual is under the care of a physician and are included in the individualized plan of care.     Estimated length of stay/service 3-5 days    Plan for post-hospital care OP care    Electronically signed by Lalita Caruso MD on 1/18/2022 at 10:03 AM

## 2022-01-18 NOTE — PROGRESS NOTES
Returned to Pt room 1:1 maintained. Pt made phone calls after admission with nurse. Pt in bed resting.

## 2022-01-18 NOTE — PROGRESS NOTES
Spiritual Support Group Note    Number of Participants in Group: 2                       Time: 15:15-15:55    Goal: Relief from isolation and loneliness             Bhumi Sharing             Self-understanding and gain insight              Acceptance and belonging            Recognize they are not alone                Socialization             Empowerment       Encouragement    Topic:  [] Spiritual Wellness and Self Care                  [] Hope                     [] Connecting with Divine/Others        [] Thankfulness and Gratitude               []  Meaningfulness and Purpose               [x] Forgiveness               [] Peace               [] Connect to [facility]      [] Other    Participation Level:   [x] Active Listener   [] Minimal   [] Monopolizing   [x] Interactive   [] No Participation   []  Other:     Attention:   [x] Alert   [] Distractible   [] Drowsy   [] Poor   [] Other:    Manner:   [x] Cooperative   [] Suspicious   [] Withdrawn   [] Guarded   [] Irritable   [] Inhospitable   [] Other:     Others Comments from Group:

## 2022-01-18 NOTE — PROGRESS NOTES
Patient did not attend group despite staff encouragement.   Electronically signed by Parveen Perez on 1/17/2022 at 9:14 PM

## 2022-01-18 NOTE — PROGRESS NOTES
Morning Community Meeting Topics    Efrain Chung attended the morning community meeting on 1/18/22. Topics discussed today     [x] Introduction   Day of the week and date   Mask distribution   Current mask requirements  [x]Teams   Explanation of  Green and Blue team criteria   Nurses assigned to each team for today   Explanation about green and blue paper  o Date  o Patient's Name  o Patient's Nurse  o Goals  [x] Visitation   Announce the visiting hours for the day   Announce which team is allowed to have visitors for the day   Review any updated Covid 19 requirements for visitors during visitation  o Vaccine Card or negative Covid test within 48 hours of visit  o State Identification   Patients are reminded to alert the  at least 1 hour before visitation   [x] Unit Orientation   Coffee use   Phone location and etiquette   Shower locations  [employer] and dryer location and process   Common area expectations   Staff rounds expectation  [x] Meals    Educate patient to the menu  o The patient is encouraged to fill out the menu to get preferences at mealtime  o The patient is educated that if they do not fill out the menu, they will get the standard tray  o The coffee pot is decaf, patient encouraged to order regular coffee from menu.    Educate patient to the meal process   Patient encouraged to eat snacks provided twice daily  o Snacks may stay in patient room     [x] Discharge Process   Discharge expectations   Fill out the survey after discharge   [x] Hygiene   Daily showers encouraged  o Showers availability discussed    Daily dressing encouraged  o Discussed wearing street clothing   Education provided on where to place linens and clothing  o Linens in the hamper  o personal clothing does not go into the linen hamper  [x] Group    Patient encouraged to attend group provided   Time of Group Meetings discussed   Gentle reminder that attendance is a Physician order  [x] Movement   Chair exercises completed   Stretching completed  Notes:Goal - \"Talk to the Doctor about going home\" Electronically signed by ERUM Clay on 1/18/2022 at 9:57 AM

## 2022-01-18 NOTE — PROGRESS NOTES
Pt. refused to attend the 1000 skills group, despite staff encouragement. Electronically signed by Odalys Alvarez, [facility] on 1/18/2022 at 11:07 AM

## 2022-01-18 NOTE — PROGRESS NOTES
Pt visible on unit more this afternoon. Seen utilizing telephone often. Tends to keep to self when out in day room. Reports feeling \"much better\" after being open and talking about her stressors and feelings. Reports she is still grieving the loss of her mother, struggling with job/legal issues and missing her daughter who just moved out. Explains that she has been taking care of everyone else and bottling up her feelings. Verbalizes her decision was poor and feels embarrassed. Currently denies any SI, HI or AVH. States that her family is now understanding and encouraging her to get the help she needs. Reports she plans to f/u after D/C and get involved in counseling. Focused on D/C and hoping to leave by Thursday and she has a trip planned to [place] with her family. Currently in day room eating snack and watching movie with peers.

## 2022-01-18 NOTE — PROGRESS NOTES
Patient did not attend group despite staff encouragement.   Electronically signed by Jun Escamilla on 1/17/2022 at 9:32 PM

## 2022-01-18 NOTE — CARE COORDINATION
Brief Intervention and Referral to Treatment Summary    Patient was provided PHQ-9, AUDIT and DAST Screening:      PHQ-9 Score: 23  AUDIT Score:    3  DAST Score:     0    Patients substance use is considered     Low Risk/Healthy x  Moderate Risk  Harmful  Dependent    Patients depression is considered:     Minimal  Mild   Moderate  Moderately Severe  Severe x    Brief Education Was Provided N/A    Patient was receptive  Patient was not receptive      Brief Intervention Is Provided (Only for AUDIT or DAST) N/A    Patient reports readiness to decrease and/or stop use and a plan was discussed   Patient denies readiness to decrease and/or stop use and a plan was not discussed      Recommendations/Referrals for Brief and/or Specialized Treatment Provided to Patient   Patient denied drug use. She scored a 3 on alcohol screening (drinks 2-4 month) but does not feel she needs treatment. As a result, no recommendations or referrals were provided to the patient at this time.

## 2022-01-19 VITALS
DIASTOLIC BLOOD PRESSURE: 94 MMHG | OXYGEN SATURATION: 99 % | HEART RATE: 90 BPM | SYSTOLIC BLOOD PRESSURE: 135 MMHG | RESPIRATION RATE: 18 BRPM | TEMPERATURE: 99 F

## 2022-01-19 PROCEDURE — 6370000000 HC RX 637 (ALT 250 FOR IP): Performed by: PSYCHIATRY & NEUROLOGY

## 2022-01-19 PROCEDURE — 90833 PSYTX W PT W E/M 30 MIN: CPT | Performed by: PSYCHIATRY & NEUROLOGY

## 2022-01-19 PROCEDURE — 99232 SBSQ HOSP IP/OBS MODERATE 35: CPT | Performed by: PSYCHIATRY & NEUROLOGY

## 2022-01-19 PROCEDURE — 6370000000 HC RX 637 (ALT 250 FOR IP): Performed by: NURSE PRACTITIONER

## 2022-01-19 PROCEDURE — 1240000000 HC EMOTIONAL WELLNESS R&B

## 2022-01-19 RX ORDER — BACITRACIN, NEOMYCIN, POLYMYXIN B 400; 3.5; 5 [USP'U]/G; MG/G; [USP'U]/G
OINTMENT TOPICAL 2 TIMES DAILY
Status: DISCONTINUED | OUTPATIENT
Start: 2022-01-19 | End: 2022-01-20 | Stop reason: HOSPADM

## 2022-01-19 RX ADMIN — BACITRACIN, NEOMYCIN, POLYMYXIN B 1 G: 400; 3.5; 5 OINTMENT TOPICAL at 21:31

## 2022-01-19 RX ADMIN — MIRTAZAPINE 15 MG: 15 TABLET, FILM COATED ORAL at 21:31

## 2022-01-19 NOTE — PROGRESS NOTES
Patient out on phone this morning. Patient says she slept great , and even though she was not hungry she ate all of it and it was good. Before admission , she was not sleeping or eating good. New medication last night helped with the sleep. ( remeron and trazodone) patient had been feeling depressed for awhile , but she said it seemed as though no one heard her asking for help , or to listen. It was one year that her mom had  and they were having family trouble with her 25year old daughter. She found herself in a very low place. Denies all at this time.   Hoping to be discharged to take their planned family trip to [place]

## 2022-01-19 NOTE — PROGRESS NOTES
Patient did not attend group despite staff encouragement.   Electronically signed by Dominique Longoria on 1/18/2022 at 9:50 PM

## 2022-01-19 NOTE — PROGRESS NOTES
[facility]. FOLLOW-UP NOTE       2022     Patient was seen and examined in person, Chart reviewed   Patient's case discussed with staff/team    Chief Complaint: Depression SA    Interim History:   3 daughter 33 21 and 25  17y/o has been problematic but she and her  are coming to know about the patient struggle and willing to work around  Daughter is going to stay with her  Less depressed and slept better  Appetite much better  Still think about her mom    Never been to counseling in the past  Guilt about the loss of her mom  Pt spoke with her  yesterday and got some positive news.  Still under investigation but no charges  Pt feel that the self harm behavior is a cry for help rather than wanting to commit suicide and people are willing to help her  Pt report that if she can sleep, she will not have problem with alcohol  Pt is planning to go to [place] for her father in laws birthday    Appetite:   - Normal/Unchanged  - Increased  - Decreased      Sleep:       - Normal/Unchanged  - Fair       - Poor              Energy:    - Normal/Unchanged  - Increased  - Decreased        SI - Present  - Absent    HI  -Present  - Absent     Aggression:  - yes  - no    Patient is - able  - unable to CONTRACT FOR SAFETY     PAST MEDICAL/PSYCHIATRIC HISTORY:   Past Medical History:   Diagnosis Date    Chronic back pain     Ectopic pregnancy     2002    Major depression, single episode 2022       FAMILY/SOCIAL HISTORY:  Family History   Problem Relation Age of Onset    Heart Disease Mother     High Blood Pressure Mother     Diabetes Mother     Kidney Disease Mother     Cancer Father     Breast Cancer Neg Hx     Colon Cancer Neg Hx     Eclampsia Neg Hx     Hypertension Neg Hx     Ovarian Cancer Neg Hx      Labor Neg Hx     Spont Abortions Neg Hx     Stroke Neg Hx      Social History     Socioeconomic History    Marital status:      Spouse name: Not on file    Number of children: Not on file    Years of education: Not on file    Highest education level: Not on file   Occupational History    Not on file   Tobacco Use    Smoking status: Never Smoker    Smokeless tobacco: Not on file   Substance and Sexual Activity    Alcohol use: No     Alcohol/week: 0.0 standard drinks    Drug use: Not on file    Sexual activity: Yes     Partners: Male     Comment: Tubal ligation w/ cs   Other Topics Concern    Not on file   Social History Narrative    Not on file     Social Determinants of Health     Financial Resource Strain:     Difficulty of Paying Living Expenses: Not on file   Food Insecurity:     Worried About Running Out of Food in the Last Year: Not on file    Parvin of Food in the Last Year: Not on file   Transportation Needs:     Lack of Transportation (Medical): Not on file    Lack of Transportation (Non-Medical): Not on file   Physical Activity:     Days of Exercise per Week: Not on file    Minutes of Exercise per Session: Not on file   Stress:     Feeling of Stress : Not on file   Social Connections:     Frequency of Communication with Friends and Family: Not on file    Frequency of Social Gatherings with Friends and Family: Not on file    Attends Restorationist Services: Not on file    Active Member of [address] [place] [address] [employer] or Organizations: Not on file    Attends Club or Organization Meetings: Not on file    Marital Status: Not on file   Intimate Partner Violence:     Fear of Current or Ex-Partner: Not on file    Emotionally Abused: Not on file    Physically Abused: Not on file    Sexually Abused: Not on file   Housing Stability:     Unable to Pay for Housing in the Last Year: Not on file    Number of [place] in the Last Year: Not on file    Unstable Housing in the Last Year: Not on file           ROS:  - All negative/unchanged except if checked.  Explain positive(checked items) below:  - Constitutional  - Eyes  - Ear/Nose/Mouth/Throat  - Respiratory  - CV  - GI  -   - Musculoskeletal  - Skin/Breast  - Neurological  - Endocrine  - Heme/Lymph  - Allergic/Immunologic    Explanation:     MEDICATIONS:    Current Facility-Administered Medications:     mirtazapine (REMERON) tablet 15 mg, 15 mg, Oral, Nightly, Jamal Vargas MD, 15 mg at 01/18/22 2120    haloperidol (HALDOL) tablet 5 mg, 5 mg, Oral, Q6H PRN **OR** haloperidol lactate (HALDOL) injection 5 mg, 5 mg, IntraMUSCular, Q6H PRN, Jamal Vargas MD    hydrOXYzine (VISTARIL) capsule 50 mg, 50 mg, Oral, Q6H PRN **OR** hydrOXYzine (VISTARIL) injection 50 mg, 50 mg, IntraMUSCular, Q6H PRN, Jamal Vargas MD    acetaminophen (TYLENOL) tablet 650 mg, 650 mg, Oral, Q4H PRN, Jamal Vargas MD    polyethylene glycol (GLYCOLAX) packet 17 g, 17 g, Oral, Daily PRN, Jamal Vargas MD    traZODone (DESYREL) tablet 50 mg, 50 mg, Oral, Nightly PRN, Jamal Vargas MD, 50 mg at 01/18/22 2121    influenza quadrivalent split vaccine (FLUZONE;FLUARIX;FLULAVAL;AFLURIA) injection 0.5 mL, 0.5 mL, IntraMUSCular, Prior to discharge, Adryan Santiago MD      Examination:  /72   Pulse 112   Temp 97.9 °F (36.6 °C)   Resp 18   SpO2 97%   Gait - steady  Medication side effects(SE): no    Mental Status Examination:    Level of consciousness:  within normal limits   Appearance:  fair grooming and fair hygiene  Behavior/Motor:  psychomotor retardation  Attitude toward examiner:  cooperative  Speech:  normal rate   Mood: less anxious and depressed  Affect:  mood congruent  Thought processes:  goal directed   Thought content:  Suicidal Ideation:  denies suicidal ideation  Cognition:  oriented to person, place, and time   Concentration intact  Insight fair   Judgement fair     ASSESSMENT:   Patient symptoms are:  [] Well controlled  [] Improving  [] Worsening  [] No change      Diagnosis:   Active Problems:    Major depression, single episode  Resolved Problems:    * No resolved hospital problems. *      LABS:    Recent Labs     01/17/22  1230   WBC 6.7   HGB 11.6*        Recent Labs     01/17/22  1230      K 3.6      CO2 25   BUN 9   CREATININE 0.68   GLUCOSE 101*     Recent Labs     01/17/22  1230   BILITOT 0.3   ALKPHOS 63   AST 20   ALT 19     Lab Results   Component Value Date    LABAMPH Neg 01/17/2022    BARBSCNU Neg 01/17/2022    LABBENZ Neg 01/17/2022    LABMETH Neg 01/17/2022    OPIATESCREENURINE Neg 01/17/2022    PHENCYCLIDINESCREENURINE Neg 01/17/2022    ETOH <10 01/17/2022     Lab Results   Component Value Date    TSH 1.160 01/17/2022     No results found for: LITHIUM  No results found for: VALPROATE, CBMZ    RISK ASSESSMENT:     Treatment Plan:  Reviewed current Medications with the patient. \  Risks, benefits, side effects, drug-to-drug interactions and alternatives to treatment were discussed. Collateral information:   CD evaluation  Encourage patient to attend group and other milieu activities.   Discharge planning discussed with the patient and treatment team.    PSYCHOTHERAPY/COUNSELING:  [x] Therapeutic interview  [x] Supportive  [] CBT  [] Ongoing  [] Other    Patient was seen 1:1 for 20 minutes, other than E&M time spent, focusing on      - coping skills techniques     - Anxiety management techniques discussed including deep breathing exercise and PMR     - discussing patients strength and weakness        - Focusing on negative cognition and maladaptive thoughts, which is feeding and maintaining the depression symptoms       [x] Patient continues to need, on a daily basis, active treatment furnished directly by or requiring the supervision of inpatient psychiatric personnel      Anticipated Length of stay:            Electronically signed by Surinder De Anda MD on 1/19/2022 at 10:55 AM

## 2022-01-19 NOTE — PROGRESS NOTES
Patient did not attend group despite staff encouragement.   Electronically signed by Brady Noriega on 1/18/2022 at 9:26 PM

## 2022-01-19 NOTE — PROGRESS NOTES
Morning Community Meeting Topics    Merritt Sosa attended the morning community meeting on 1/19/22. Topics discussed today     [x] Introduction   Day of the week and date   Mask distribution   Current mask requirements  [x]Teams   Explanation of  Green and Blue team criteria   Nurses assigned to each team for today   Explanation about green and blue paper  o Date  o Patient's Name  o Patient's Nurse  o Goals  [x] Visitation   Announce the visiting hours for the day   Announce which team is allowed to have visitors for the day   Review any updated Covid 19 requirements for visitors during visitation  o Vaccine Card or negative Covid test within 48 hours of visit  o State Identification   Patients are reminded to alert the  at least 1 hour before visitation   [x] Unit Orientation   Coffee use   Phone location and etiquette   Shower locations  [employer] and dryer location and process   Common area expectations   Staff rounds expectation  [x] Meals    Educate patient to the menu  o The patient is encouraged to fill out the menu to get preferences at mealtime  o The patient is educated that if they do not fill out the menu, they will get the standard tray  o The coffee pot is decaf, patient encouraged to order regular coffee from menu.    Educate patient to the meal process   Patient encouraged to eat snacks provided twice daily  o Snacks may stay in patient room     [x] Discharge Process   Discharge expectations   Fill out the survey after discharge   [x] Hygiene   Daily showers encouraged  o Showers availability discussed    Daily dressing encouraged  o Discussed wearing street clothing   Education provided on where to place linens and clothing  o Linens in the hamper  o personal clothing does not go into the linen hamper  [x] Group    Patient encouraged to attend group provided   Time of Group Meetings discussed   Gentle reminder that attendance is a Physician order  [x] Movement   Chair exercises completed   Stretching completed  Notes:  GOAL : \" to go home\" Electronically signed by Mar Hernandez on 1/19/2022 at 12:20 PM

## 2022-01-19 NOTE — GROUP NOTE
Group Therapy Note    Date: 1/19/2022    Group Start Time: 6216  Group End Time: 7202  Group Topic: Healthy Living/Wellness    [facility]    Braulio Conway        Group Therapy Note    Attendees: 10/19         Patient's Goal:  To learn how to live a healthy lifestyle. Topics covered included sleep hygiene and healthy coping skills. Notes:  Patient participated in group discussion appropriately.     Status After Intervention:  Unchanged    Participation Level: Interactive    Participation Quality: Appropriate and Attentive      Speech:  hesitant      Thought Process/Content: Logical      Affective Functioning: Flat      Mood: euthymic      Level of consciousness:  Alert and Attentive      Response to Learning: Progressing to goal      Endings: None Reported    Modes of Intervention: Education      Discipline Responsible: [address], [employer]      Signature:  Braulio Conway

## 2022-01-19 NOTE — PROGRESS NOTES
Pt states depression is #3/10; anxiety is # 5/10. Denies SI/HI/AVH. Pt states she attends all groups, Pt states she has no appetite. Pt showered today, Pt slept 6 hrs last night.

## 2022-01-19 NOTE — GROUP NOTE
Group Therapy Note    Date: 1/19/2022    Group Start Time: 5082  Group End Time: 1440  Group Topic: Psychoeducation    [facility] Danielle        Group Therapy Note    Attendees: 7         Patient's Goal:  To participate in [facility] Psychoeducational group exercise. Notes:  During the group exercise patient shared that she likes to travel and that she has traveled to [place]. Status After Intervention:  Improved    Participation Level:  Active Listener and Interactive    Participation Quality: Appropriate and Sharing      Speech:  normal      Thought Process/Content: Logical      Affective Functioning: Congruent      Mood: Calm      Level of consciousness:  Alert and Attentive      Response to Learning: Able to retain information      Endings: None Reported    Modes of Intervention: Education      Discipline Responsible: /Counselor      Signature:  BREA Benedict

## 2022-01-19 NOTE — CARE COORDINATION
Pt is calm, cooperative, and pleasant. Denies all. Denies depression and anxiety. \"I'm excited to go home tomorrow. \"  Good sleep and appetite reported. Neosporin ordered for cut on left wrist.  Good eye contact and bright affect at times. Pt reports being the one everyone in the family depends on. Pt is finally grieving mother's death one year later. Pt shared that she thinks her family finally believes that she is having a hard time.

## 2022-01-19 NOTE — GROUP NOTE
Group Therapy Note    Date: 1/19/2022    Group Start Time: 1000  Group End Time: 1100  Group Topic: Psychoeducation    [facility]    Jenn Cuellar, CTRS        Group Therapy Note    Attendees: 9         Patient's Goal: \"to go home\"    Notes:  Pt. attended the 1000 skill group. Pt. was talkative and fondly reminisced about trips to [facility]. Flat affect. Low energy. Status After Intervention:  Improved    Participation Level:  Active Listener and Interactive    Participation Quality: Appropriate, Attentive and Sharing      Speech:  normal      Thought Process/Content: Logical      Affective Functioning: Flat      Mood: depressed      Level of consciousness:  Alert, Oriented x4 and Attentive      Response to Learning: Progressing to goal      Endings: None Reported    Modes of Intervention: Education, Support, Socialization and Activity      Discipline Responsible: Psychoeducational Specialist      Signature:  Tha Emerson

## 2022-01-19 NOTE — GROUP NOTE
Group Therapy Note    Date: 1/19/2022    Group Start Time: 1300  Group End Time: 1400  Group Topic: Healthy Living/Wellness    [facility]    Jose Matson RN        Group Therapy Note    Attendees:          Patient's Goal:  Pt reports benefits of establishing positive behaviors    Notes:  Pt reports benefits of establishing positive behaviors    Status After Intervention:  Improved    Participation Level:  Active Listener    Participation Quality: Appropriate and Attentive      Speech:  normal      Thought Process/Content: Logical      Affective Functioning: Congruent      Mood: euthymic      Level of consciousness:  Alert and Oriented x4      Response to Learning: Able to verbalize current knowledge/experience      Endings: None Reported    Modes of Intervention: Education and Support      Discipline Responsible: Registered Nurse      Signature:  Jose Matson RN

## 2022-01-20 PROCEDURE — 99239 HOSP IP/OBS DSCHRG MGMT >30: CPT | Performed by: PSYCHIATRY & NEUROLOGY

## 2022-01-20 PROCEDURE — 6370000000 HC RX 637 (ALT 250 FOR IP): Performed by: NURSE PRACTITIONER

## 2022-01-20 RX ORDER — BACITRACIN, NEOMYCIN, POLYMYXIN B 400; 3.5; 5 [USP'U]/G; MG/G; [USP'U]/G
OINTMENT TOPICAL
COMMUNITY
Start: 2022-01-20 | End: 2022-01-30

## 2022-01-20 RX ORDER — MIRTAZAPINE 15 MG/1
15 TABLET, FILM COATED ORAL NIGHTLY
Qty: 15 TABLET | Refills: 3 | Status: SHIPPED | OUTPATIENT
Start: 2022-01-20

## 2022-01-20 RX ADMIN — BACITRACIN, NEOMYCIN, POLYMYXIN B 1 G: 400; 3.5; 5 OINTMENT TOPICAL at 09:26

## 2022-01-20 NOTE — DISCHARGE SUMMARY
DISCHARGE SUMMARY      Patient ID:  Kisha Rodriguez  [id number]  42 y.o.  1975      Admit date: 1/17/2022    Discharge date and time: 1/20/2022    Admitting Physician: Namrata Camejo MD     Discharge Physician: Dr Dottie Sosa MD    Admission Diagnoses: Major depression, single episode [F32.9]  Suicide attempt [facility]) [T14.91XA]  Wrist laceration, left, initial encounter [S61.512A]    Admission Condition: poor    Discharged Condition: stable    Admission Circumstance:        Patient brought to ED by  after suicide attempt of cutting left wrist. Patient states that she is going through a lot at this time causing her to be depressed. Patient lost her mother 1 year ago due to [place]. Patient is also having a difficult time with her daughter who recently turned 25 because her daughter and  do not jose along. Patient states she is stuck in a hard place choosing between her  and her child. Patient presents depressed, hopeless and helpless.     HISTORY OF PRESENT ILLNESS:       The patient is a 42 y.o. female  with 3 adult children with no significant past history. Patient is living in [place] with her  of 13 years. Patient is currently employed full time at [employer] in [employer] [facility] [employer] [facility].      Pt has been feeling  depressed and anxious past year. Mom passed away Dec 2020 from [place] 45 continue to have lot of guilt about her death  Stressors:daughter just turned 25 and because she is not getting along with her step father, she was kicked out. States it is hard to choose between her  and her child. Patient states that work is stressful. Currently going through a theft case at previous place of employment.      Depression Severity: Rating mood to be around 2/10 (10- good)  Quality:melancholic  Worse in the morning  Content: Hopeless, worthless and helpless feeling  Suicidal thoughts - Pt cut her left wrist with  yesterday.   She tried to talk to her  before that attempt and he thought that she would get over it  Associated symptoms:  Poor concentration, anhedonia, decrease motivation  Sleep and appetite- poor        Family: Kristen [phone]  Tegan Hua states that his wife has been very depressed lately. States that she lost her mother a year ago due to [place] and is still grieving the loss of her mother. 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 States patient is not elaborating much on the details or severity of the case. States that the patient is having troubles with her daughter who is 25 but does not elaborate. States that the patient rarely goes to [employer] but went to [employer] 1/16 which he felt was very odd. States that the patient socially drinks on weekends but has been drinking beer almost daily upon getting home from her new place of employment.  States that the patient said she can't get her brain to shut off and stated that she needed a psychiatric evaluation.      The patient is not currently receiving care for the above psychiatric illness.     Medications Prior to Admission:     Prescriptions Prior to Admission   Medications Prior to Admission: ferrous sulfate (FE TABS) 325 (65 FE) MG EC tablet, Take 1 tablet by mouth daily (with breakfast)  medroxyPROGESTERone (PROVERA) 10 MG tablet, Take 1 tablet by mouth daily        Compliance:n/a     Psychiatric Review of Systems       Depression: yes     Loree or Hypomania:  no     Panic Attacks:  yes      Phobias:  no     Obsessions and Compulsions:  no     PTSD : no     Hallucinations:  no     Delusions:  no     Substance Abuse History:  ETOH: Pt started drinking daily 2 weeks ago, few beers after work  Normally drink over weekend  Drink to sleep and shut down her mind   Marijuana: no  Opiates: no  Other Drugs: no        Past Psychiatric History:  Prior Diagnosis:  NO  Psychiatrist: no  Therapist:no  Hospitalization: no  Hx of Suicidal Attempts: no  Hx of violence:  no  ECT: no  Previous discontinued Psychiatric Med Trials: no           PAST MEDICAL/PSYCHIATRIC HISTORY:   Past Medical History:   Diagnosis Date    Chronic back pain     Ectopic pregnancy     2002    Major depression, single episode 2022       FAMILY/SOCIAL HISTORY:  Family History   Problem Relation Age of Onset    Heart Disease Mother     High Blood Pressure Mother     Diabetes Mother     Kidney Disease Mother     Cancer Father     Breast Cancer Neg Hx     Colon Cancer Neg Hx     Eclampsia Neg Hx     Hypertension Neg Hx     Ovarian Cancer Neg Hx      Labor Neg Hx     Spont Abortions Neg Hx     Stroke Neg Hx      Social History     Socioeconomic History    Marital status:      Spouse name: Not on file    Number of children: Not on file    Years of education: Not on file    Highest education level: Not on file   Occupational History    Not on file   Tobacco Use    Smoking status: Never Smoker    Smokeless tobacco: Not on file   Substance and Sexual Activity    Alcohol use: No     Alcohol/week: 0.0 standard drinks    Drug use: Not on file    Sexual activity: Yes     Partners: Male     Comment: Tubal ligation w/ cs   Other Topics Concern    Not on file   Social History Narrative    Not on file     Social Determinants of Health     Financial Resource Strain:     Difficulty of Paying Living Expenses: Not on file   Food Insecurity:     Worried About Running Out of Food in the Last Year: Not on file    Parvin of Food in the Last Year: Not on file   Transportation Needs:     Lack of Transportation (Medical): Not on file    Lack of Transportation (Non-Medical):  Not on file   Physical Activity:     Days of Exercise per Week: Not on file    Minutes of Exercise per Session: Not on file   Stress:     Feeling of Stress : Not on file   Social Connections:     Frequency of Communication with Friends and Family: Not on file    Frequency of Social Gatherings with Friends and Family: Not on file  Attends Voodoo Services: Not on file    Active Member of Clubs or Organizations: Not on file    Attends Club or Organization Meetings: Not on file    Marital Status: Not on file   Intimate Partner Violence:     Fear of Current or Ex-Partner: Not on file    Emotionally Abused: Not on file    Physically Abused: Not on file    Sexually Abused: Not on file   Housing Stability:     Unable to Pay for Housing in the Last Year: Not on file    Number of [place] in the Last Year: Not on file    Unstable Housing in the Last Year: Not on file       MEDICATIONS:    Current Facility-Administered Medications:     neomycin-bacitracin-polymyxin (NEOSPORIN) ointment, , Topical, BID, Dong Marquez APRN - NP, 1 g at 01/20/22 0926    mirtazapine (REMERON) tablet 15 mg, 15 mg, Oral, Nightly, Raissa Ponce MD, 15 mg at 01/19/22 2131    haloperidol (HALDOL) tablet 5 mg, 5 mg, Oral, Q6H PRN **OR** haloperidol lactate (HALDOL) injection 5 mg, 5 mg, IntraMUSCular, Q6H PRN, Raissa Ponce MD    hydrOXYzine (VISTARIL) capsule 50 mg, 50 mg, Oral, Q6H PRN **OR** hydrOXYzine (VISTARIL) injection 50 mg, 50 mg, IntraMUSCular, Q6H PRN, Raissa Ponce MD    acetaminophen (TYLENOL) tablet 650 mg, 650 mg, Oral, Q4H PRN, Raissa Ponce MD    polyethylene glycol (GLYCOLAX) packet 17 g, 17 g, Oral, Daily PRN, Raissa Ponce MD    traZODone (DESYREL) tablet 50 mg, 50 mg, Oral, Nightly PRN, Raissa Ponce MD, 50 mg at 01/18/22 2121    influenza quadrivalent split vaccine (FLUZONE;FLUARIX;FLULAVAL;AFLURIA) injection 0.5 mL, 0.5 mL, IntraMUSCular, Prior to discharge, Ernie Gay MD    Examination:  BP (!) 135/94   Pulse 90   Temp 99 °F (37.2 °C)   Resp 18   SpO2 99%   Gait - steady    HOSPITAL COURSE[de-identified]  Following admission to the hospital, patient had a complete physical exam and blood work up  Patient was monitored closely with suicide precaution  Patient was started on meds as listed below  Was encouraged to participate in group and other milieu activity  Patient started to feel better with this combination of treatment. Significant progress in the symptoms since admission. Mood better, with the score of 2/10 - bad  No AVH or paranoid thoughts  No Hopeless or worthless feeling  No active SI/HI  Appetite:  [x] Normal  [] Increased  [] Decreased    Sleep:       [x] Normal  [] Fair       [] Poor            Energy:    [x] Normal  [] Increased  [] Decreased     SI [] Present  [x] Absent  HI  []Present  [x] Absent   Aggression:  [] yes  [] no  Patient is [x] able  [] unable to CONTRACT FOR SAFETY   Medication side effects(SE):  [x] None(Psych. Meds.) [] Other      Mental Status Examination on discharge:    Level of consciousness:  within normal limits   Appearance:  well-appearing  Behavior/Motor:  no abnormalities noted  Attitude toward examiner:  attentive and good eye contact  Speech:  spontaneous, normal rate and normal volume   Mood: euthymic  Affect:  mood congruent  Thought processes:  goal directed   Thought content:  Suicidal Ideation:  denies suicidal ideation  Delusions:  no evidence of delusions  Perceptual Disturbance:  denies any perceptual disturbance  Cognition:  oriented to person, place, and time   Concentration intact  Memory intact  Insight good   Judgement fair   Fund of Knowledge adequate      ASSESSMENT:  Patient symptoms are:  [x] Well controlled  [x] Improving  [] Worsening  [] No change      Diagnosis:  Active Problems:    Major depression, single episode  Resolved Problems:    * No resolved hospital problems.  *      LABS:    Recent Labs     01/17/22  1230   WBC 6.7   HGB 11.6*        Recent Labs     01/17/22  1230      K 3.6      CO2 25   BUN 9   CREATININE 0.68   GLUCOSE 101*     Recent Labs     01/17/22  1230   BILITOT 0.3   ALKPHOS 63   AST 20   ALT 19     Lab Results   Component Value Date    LABAMPH Neg 01/17/2022    BARBSCNU Neg 01/17/2022    LABBENZ Neg 01/17/2022    LABMETH Neg 01/17/2022    OPIATESCREENURINE Neg 01/17/2022    PHENCYCLIDINESCREENURINE Neg 01/17/2022    ETOH <10 01/17/2022     Lab Results   Component Value Date    TSH 1.160 01/17/2022     No results found for: LITHIUM  No results found for: VALPROATE, CBMZ    RISK ASSESSMENT AT DISCHARGE: Low risk for suicide and homicide. Treatment Plan:  Reviewed current Medications with the patient. Education provided on the complaince with treatment. Risks, benefits, side effects, drug-to-drug interactions and alternatives to treatment were discussed. Encourage patient to attend outpatient follow up appointment and therapy. Patient was advised to call the outpatient provider, visit the nearest ED or call 911 if symptoms are not manageable. Patient's family member was contacted prior to the discharge. Medication List      START taking these medications    mirtazapine 15 MG tablet  Commonly known as: REMERON  Take 1 tablet by mouth nightly     neomycin-bacitracin-polymyxin 400-5-5000 ointment  Commonly known as: NEOSPORIN  Apply topically 2 times daily.         STOP taking these medications    ferrous sulfate 325 (65 Fe) MG EC tablet  Commonly known as: Fe Tabs     medroxyPROGESTERone 10 MG tablet  Commonly known as: Provera           Where to Get Your Medications      These medications were sent to [address] [place] [address] [address] [place] [address], [address] [address]    Phone: [phone]   · mirtazapine 15 MG tablet     You can get these medications from any pharmacy    You don't need a prescription for these medications  · neomycin-bacitracin-polymyxin 400-5-5000 ointment           Reason for more than one antipsychotic:   [x] N/A  [] 3 failed monotherapy(drugs tried):  [] Cross over to a new antipsychotic  [] Taper to monotherapy from polypharmacy  [] Augmentation of Clozapine therapy due to treatment resistance to single therapy        TIME SPEND - 35 MINUTES TO COMPLETE THE EVALUATION, DISCHARGE SUMMARY, MEDICATION RECONCILIATION AND FOLLOW UP CARE     Signed:  Lili Jacob MD  1/20/2022  9:32 AM

## 2022-01-20 NOTE — GROUP NOTE
Group Therapy Note    Date: 1/19/2022    Group Start Time: 2110  Group End Time: 2120  Group Topic: Wrap-Up    [facility]    Stephanie Felder        Group Therapy Note    Attendees: 4/21         Patient's Goal:  \"to be prepared to go home\"    Notes:  Patient reported meeting their goal for the day. Patient shared enjoying talking to her son earlier today.     Status After Intervention:  Unchanged    Participation Level: Interactive    Participation Quality: Appropriate, Attentive and Sharing      Speech:  normal      Thought Process/Content: Logical      Affective Functioning: Congruent      Mood: euthymic      Level of consciousness:  Alert and Attentive      Response to Learning: Progressing to goal      Endings: None Reported    Modes of Intervention: Support      Discipline Responsible: [employer]      Signature:  Stephanie Felder

## 2022-01-20 NOTE — GROUP NOTE
Group Therapy Note    Date: 1/19/2022    Group Start Time: 2045  Group End Time: 2110  Group Topic: Recreational    [facility]    Tod Monday        Group Therapy Note    Attendees: 5/21         Patient's Goal:  To play Wii [place] with the group. Notes:  Patient played the game with peers.     Status After Intervention:  Improved    Participation Level: Interactive    Participation Quality: Appropriate, Attentive and Supportive      Speech:  normal      Thought Process/Content: Logical      Affective Functioning: Congruent      Mood: euthymic      Level of consciousness:  Alert and Attentive      Response to Learning: Progressing to goal      Endings: None Reported    Modes of Intervention: Activity      Discipline Responsible: [employer]      Signature:  Tod Monday

## 2022-01-20 NOTE — DISCHARGE INSTR - DIET

## 2022-01-20 NOTE — PROGRESS NOTES
Pt offered flu vaccine. Patient refused and stated she has already received one elsewhere.  Electronically signed by Cheryle Allis, LPN on 8/20/9540 at 67:02 AM

## 2022-01-20 NOTE — CARE COORDINATION
FAMILY COLLATERAL NOTE    Family/Support Name: Cj Chery #:[phone]  Relationship to Pt[de-identified]  of 12 years        Family/Support contact aware of hospitalization:  Presenting Symptoms/Current Concerns:yes  Patient had just came home and patient was on the floor passed out . Patient had already cut her wrist. When he saw her he called an ambulance. She told  stressors of the job and daughter. She used a razor to cut her wrist. First time she has ever done anything like this before. Has never tried to kill herself before. They are leaving for [address] [place] [address] today with family.  thinks patient is doing better. She has no thoughts of not wanting to be here. They have worked everything out now. Top 3 Life Stressors:     Daughter  job    Background History Relevant to Current Hospitalization:  First time she has ever been admitted to a psych unit. Family Mental Health/Substance Use History:     none    Support Network's Goal for Hospitalization: to feel better    Discharge Plan: will be back from vacation. Support Network Supportive of Discharge Plan: yes      Support can confirm Safety of Location and Security of Weapons:    removed all the razors and sharps in the home and will also make sure on vation in dads home all sharps are removed. There are no weapons in patients home or husbands fathers home. Support agreeable to Safeguard and Monitor Medications (including Prescription and OTC): Patient will take her meds and  will help her remember.      Identified Barriers to Compliance with Discharge Plan: none    Recommendations for Support Network:     Call [facility] with any questions    [facility]

## 2022-01-20 NOTE — GROUP NOTE
Group Therapy Note    Date: 1/20/2022    Group Start Time: 1000  Group End Time: 1100  Group Topic: Psychoeducation    [facility]    Eliseo Reynolds, CTRS        Group Therapy Note    Attendees: 7         Patient's Goal:  \"to go home\"    Notes:  Pt. attended the 1000 skill group. Very anxious to go home and prepare for a family trip. Flat affect but pleasant upon approach. Frustrated that it is taking so long. Status After Intervention:  Improved    Participation Level:  Active Listener and Interactive    Participation Quality: Appropriate, Attentive and Sharing      Speech:  normal      Thought Process/Content: Logical      Affective Functioning: Flat      Mood: anxious and slightly irritable, calm      Level of consciousness:  Alert, Oriented x4, Attentive and Preoccupied      Response to Learning: Able to change behavior and Progressing to goal      Endings: None Reported    Modes of Intervention: Education, Support, Socialization and Activity      Discipline Responsible: Psychoeducational Specialist      Signature:  Campbell Finney

## 2022-01-20 NOTE — PROGRESS NOTES
Morning Community Meeting Topics    Hieu Ramires attended the morning community meeting on 1/20/22. Topics discussed today     [x] Introduction   Day of the week and date   Mask distribution   Current mask requirements  [x]Teams   Explanation of  Green and Blue team criteria   Nurses assigned to each team for today   Explanation about green and blue paper  o Date  o Patient's Name  o Patient's Nurse  o Goals  [x] Visitation   Announce the visiting hours for the day   Announce which team is allowed to have visitors for the day   Review any updated Covid 19 requirements for visitors during visitation  o Vaccine Card or negative Covid test within 48 hours of visit  o State Identification   Patients are reminded to alert the  at least 1 hour before visitation   [x] Unit Orientation   Coffee use   Phone location and etiquette   Shower locations  [employer] and dryer location and process   Common area expectations   Staff rounds expectation  [x] Meals    Educate patient to the menu  o The patient is encouraged to fill out the menu to get preferences at mealtime  o The patient is educated that if they do not fill out the menu, they will get the standard tray  o The coffee pot is decaf, patient encouraged to order regular coffee from menu.    Educate patient to the meal process   Patient encouraged to eat snacks provided twice daily  o Snacks may stay in patient room     [x] Discharge Process   Discharge expectations   Fill out the survey after discharge   [x] Hygiene   Daily showers encouraged  o Showers availability discussed    Daily dressing encouraged  o Discussed wearing street clothing   Education provided on where to place linens and clothing  o Linens in the hamper  o personal clothing does not go into the linen hamper  [x] Group    Patient encouraged to attend group provided   Time of Group Meetings discussed   Gentle reminder that attendance is a Physician order  [x] Movement   Chair exercises completed   Stretching completed  Notes:  GOAL : \" to feel better\" Electronically signed by Ken Lundberg on 1/20/2022 at 12:43 PM

## 2023-04-18 ENCOUNTER — HOSPITAL ENCOUNTER (OUTPATIENT)
Dept: GENERAL RADIOLOGY | Age: 48
Discharge: HOME | End: 2023-04-20
Payer: COMMERCIAL

## 2023-04-18 DIAGNOSIS — R13.13 DYSPHAGIA, PHARYNGEAL PHASE: ICD-10-CM

## 2023-04-18 PROCEDURE — 74220 X-RAY XM ESOPHAGUS 1CNTRST: CPT

## 2023-04-18 PROCEDURE — 2500000003 HC RX 250 WO HCPCS: Performed by: STUDENT IN AN ORGANIZED HEALTH CARE EDUCATION/TRAINING PROGRAM

## 2023-04-18 RX ADMIN — BARIUM SULFATE 176 ML: 960 POWDER, FOR SUSPENSION ORAL at 08:28

## 2023-04-18 RX ADMIN — BARIUM SULFATE 340 ML: 980 POWDER, FOR SUSPENSION ORAL at 08:28

## 2024-03-29 ENCOUNTER — OFFICE VISIT (OUTPATIENT)
Dept: PRIMARY CARE | Facility: CLINIC | Age: 49
End: 2024-03-29
Payer: COMMERCIAL

## 2024-03-29 VITALS
RESPIRATION RATE: 18 BRPM | OXYGEN SATURATION: 100 % | TEMPERATURE: 97.5 F | HEART RATE: 84 BPM | BODY MASS INDEX: 33.99 KG/M2 | HEIGHT: 65 IN | SYSTOLIC BLOOD PRESSURE: 122 MMHG | DIASTOLIC BLOOD PRESSURE: 82 MMHG | WEIGHT: 204 LBS

## 2024-03-29 DIAGNOSIS — Z00.00 ANNUAL PHYSICAL EXAM: ICD-10-CM

## 2024-03-29 DIAGNOSIS — Z12.11 COLON CANCER SCREENING: ICD-10-CM

## 2024-03-29 DIAGNOSIS — D50.0 IRON DEFICIENCY ANEMIA DUE TO CHRONIC BLOOD LOSS: Primary | ICD-10-CM

## 2024-03-29 DIAGNOSIS — Z23 NEED FOR TDAP VACCINATION: ICD-10-CM

## 2024-03-29 DIAGNOSIS — N92.1 MENORRHAGIA WITH IRREGULAR CYCLE: ICD-10-CM

## 2024-03-29 DIAGNOSIS — Z12.31 ENCOUNTER FOR SCREENING MAMMOGRAM FOR BREAST CANCER: ICD-10-CM

## 2024-03-29 PROCEDURE — 99386 PREV VISIT NEW AGE 40-64: CPT | Performed by: PHYSICIAN ASSISTANT

## 2024-03-29 PROCEDURE — 90715 TDAP VACCINE 7 YRS/> IM: CPT | Performed by: PHYSICIAN ASSISTANT

## 2024-03-29 PROCEDURE — 90471 IMMUNIZATION ADMIN: CPT | Performed by: PHYSICIAN ASSISTANT

## 2024-03-29 PROCEDURE — 1036F TOBACCO NON-USER: CPT | Performed by: PHYSICIAN ASSISTANT

## 2024-03-29 RX ORDER — POLYETHYLENE GLYCOL 3350, SODIUM CHLORIDE, SODIUM BICARBONATE, POTASSIUM CHLORIDE 420; 11.2; 5.72; 1.48 G/4L; G/4L; G/4L; G/4L
4000 POWDER, FOR SOLUTION ORAL ONCE
Qty: 4000 ML | Refills: 0 | Status: SHIPPED | OUTPATIENT
Start: 2024-03-29 | End: 2024-03-29

## 2024-03-29 ASSESSMENT — ENCOUNTER SYMPTOMS
EYE PAIN: 0
FATIGUE: 1
ARTHRALGIAS: 0
VOMITING: 0
ABDOMINAL PAIN: 0
CONSTIPATION: 0
NAUSEA: 0
DIARRHEA: 0
MYALGIAS: 0
SHORTNESS OF BREATH: 0

## 2024-03-29 NOTE — ASSESSMENT & PLAN NOTE
- Labs: DUE, ordered today   - Cologuard/ Colonoscopy: DUE, ordered today   Vaccines:   - Tetanus (q10yrs): Updated today   - COVID-19: Moderna x 2 in 2021, declines boosters   Women's health:  - PAP: UTD   - Mammogram: DUE, ordered today   Lifestyle Modification:  - Discussed DIET - doing well with a healthy diet. Encouraged she continue this!      - Discussed EXERCISE - doing great with regular exercise 3 days a week, going to the gym. Encouraged she continue this!   - Encouraged pt to get yearly eye and dental exams

## 2024-03-29 NOTE — PROGRESS NOTES
"Subjective   Patient ID: Jerilyn Guevara is a 49 y.o. female who presents for Establish Care (New pt here today to [facility]; previously seen [facility]-Dr Merida. Pt would like to discuss getting a ref for GYN menses since Nov every day; sometimes heavy sometimes not. ).    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   - Dentist visits: utd   - Eye exams: utd     Menorrhagia and irregular cycle  - has been bleeding since November and feeling drained   - not on any birth control       Review of Systems   Constitutional:  Positive for fatigue.   Eyes:  Negative for pain and visual disturbance.   Respiratory:  Negative for shortness of breath.    Cardiovascular:  Negative for chest pain.   Gastrointestinal:  Negative for abdominal pain, constipation, diarrhea, nausea and vomiting.   Genitourinary:  Positive for menstrual problem.   Musculoskeletal:  Negative for arthralgias and myalgias.   Skin:  Negative for rash.       Objective   /82   Pulse 84   Temp 36.4 °C (97.5 °F)   Resp 18   Ht 1.645 m (5' 4.75\")   Wt 92.5 kg (204 lb)   SpO2 100%   BMI 34.21 kg/m²     Physical Exam  Constitutional:       General: She is not in acute distress.     Appearance: Normal appearance.   HENT:      Head: Normocephalic.      Right Ear: Tympanic membrane and ear canal normal.      Left Ear: Tympanic " membrane and ear canal normal.      Nose: Nose normal.      Mouth/Throat:      Mouth: Mucous membranes are moist.      Pharynx: Oropharynx is clear.   Eyes:      Extraocular Movements: Extraocular movements intact.      Conjunctiva/sclera: Conjunctivae normal.      Pupils: Pupils are equal, round, and reactive to light.      Comments: Wearing glasses   Cardiovascular:      Rate and Rhythm: Normal rate and regular rhythm.      Pulses: Normal pulses.      Heart sounds: No murmur heard.  Pulmonary:      Effort: Pulmonary effort is normal.      Breath sounds: Normal breath sounds. No wheezing, rhonchi or rales.   Abdominal:      General: Bowel sounds are normal. There is no distension.      Palpations: Abdomen is soft. There is no mass.      Tenderness: There is abdominal tenderness in the right lower quadrant, suprapubic area and left lower quadrant. There is no guarding.   Musculoskeletal:         General: Normal range of motion.      Cervical back: Neck supple.   Lymphadenopathy:      Cervical: No cervical adenopathy.   Skin:     General: Skin is warm and dry.      Findings: No lesion or rash.   Neurological:      General: No focal deficit present.      Mental Status: She is alert.      Gait: Gait normal.   Psychiatric:         Attention and Perception: Attention normal.         Mood and Affect: Affect normal. Mood is anxious.         Speech: Speech normal.         Behavior: Behavior normal. Behavior is cooperative.         Thought Content: Thought content normal.         Cognition and Memory: Cognition normal.         Judgment: Judgment normal.         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 Encouraged she continue this!      - Discussed EXERCISE - doing great with regular exercise 3 days a week, going to the gym. Encouraged she continue this!   - Encouraged pt to get yearly eye and dental exams          Relevant Orders    Comprehensive Metabolic Panel    Lipid Panel    Hemoglobin A1C     Other Visit Diagnoses       Colon cancer screening        Relevant Orders    Colonoscopy Screening; Average Risk Patient    Encounter for screening mammogram for breast cancer        Relevant Orders    BI mammo bilateral screening tomosynthesis    Menorrhagia with irregular cycle        Relevant Orders    Referral to Gynecology    Need for Tdap vaccination        Relevant Orders    Tdap vaccine, age 7 years and older (Completed)

## 2024-03-29 NOTE — ASSESSMENT & PLAN NOTE
- Has required iron infusions in the past   - Will recheck CBC, ferritin, iron/TIBC   - Anticipate that she will need another iron infusion given her 5 month non-stop menstrual bleeding

## 2024-04-01 DIAGNOSIS — D50.0 IRON DEFICIENCY ANEMIA DUE TO CHRONIC BLOOD LOSS: ICD-10-CM

## 2024-04-01 DIAGNOSIS — Z30.9 ENCOUNTER FOR CONTRACEPTIVE MANAGEMENT, UNSPECIFIED TYPE: Primary | ICD-10-CM

## 2024-04-01 RX ORDER — LEVONORGESTREL/ETHIN.ESTRADIOL 0.1-0.02MG
1 TABLET ORAL DAILY
Qty: 28 TABLET | Refills: 0 | Status: SHIPPED | OUTPATIENT
Start: 2024-04-01 | End: 2024-05-29 | Stop reason: WASHOUT

## 2024-04-02 ENCOUNTER — LAB (OUTPATIENT)
Dept: LAB | Facility: LAB | Age: 49
End: 2024-04-02
Payer: COMMERCIAL

## 2024-04-02 DIAGNOSIS — D50.0 IRON DEFICIENCY ANEMIA DUE TO CHRONIC BLOOD LOSS: ICD-10-CM

## 2024-04-02 DIAGNOSIS — Z00.00 ANNUAL PHYSICAL EXAM: ICD-10-CM

## 2024-04-02 LAB
ALBUMIN SERPL BCP-MCNC: 3.6 G/DL (ref 3.4–5)
ALP SERPL-CCNC: 27 U/L (ref 33–110)
ALT SERPL W P-5'-P-CCNC: 9 U/L (ref 7–45)
ANION GAP SERPL CALC-SCNC: 12 MMOL/L (ref 10–20)
AST SERPL W P-5'-P-CCNC: 11 U/L (ref 9–39)
BILIRUB SERPL-MCNC: 0.3 MG/DL (ref 0–1.2)
BUN SERPL-MCNC: 11 MG/DL (ref 6–23)
CALCIUM SERPL-MCNC: 8.6 MG/DL (ref 8.6–10.3)
CHLORIDE SERPL-SCNC: 107 MMOL/L (ref 98–107)
CHOLEST SERPL-MCNC: 182 MG/DL (ref 0–199)
CHOLESTEROL/HDL RATIO: 6.7
CO2 SERPL-SCNC: 22 MMOL/L (ref 21–32)
CREAT SERPL-MCNC: 0.82 MG/DL (ref 0.5–1.05)
EGFRCR SERPLBLD CKD-EPI 2021: 89 ML/MIN/1.73M*2
ERYTHROCYTE [DISTWIDTH] IN BLOOD BY AUTOMATED COUNT: 17.6 % (ref 11.5–14.5)
EST. AVERAGE GLUCOSE BLD GHB EST-MCNC: 105 MG/DL
FERRITIN SERPL-MCNC: 11 NG/ML (ref 8–150)
GLUCOSE SERPL-MCNC: 92 MG/DL (ref 74–99)
HBA1C MFR BLD: 5.3 %
HCT VFR BLD AUTO: 30.7 % (ref 36–46)
HDLC SERPL-MCNC: 27.2 MG/DL
HGB BLD-MCNC: 8.7 G/DL (ref 12–16)
IRON SATN MFR SERPL: ABNORMAL %
IRON SERPL-MCNC: 19 UG/DL (ref 35–150)
LDLC SERPL CALC-MCNC: 117 MG/DL
MCH RBC QN AUTO: 20.9 PG (ref 26–34)
MCHC RBC AUTO-ENTMCNC: 28.3 G/DL (ref 32–36)
MCV RBC AUTO: 74 FL (ref 80–100)
NON HDL CHOLESTEROL: 155 MG/DL (ref 0–149)
NRBC BLD-RTO: 0 /100 WBCS (ref 0–0)
PLATELET # BLD AUTO: 485 X10*3/UL (ref 150–450)
POTASSIUM SERPL-SCNC: 4.1 MMOL/L (ref 3.5–5.3)
PROT SERPL-MCNC: 6.6 G/DL (ref 6.4–8.2)
RBC # BLD AUTO: 4.17 X10*6/UL (ref 4–5.2)
SODIUM SERPL-SCNC: 137 MMOL/L (ref 136–145)
TIBC SERPL-MCNC: ABNORMAL UG/DL
TRIGL SERPL-MCNC: 189 MG/DL (ref 0–149)
UIBC SERPL-MCNC: >450 UG/DL (ref 110–370)
VLDL: 38 MG/DL (ref 0–40)
WBC # BLD AUTO: 7.7 X10*3/UL (ref 4.4–11.3)

## 2024-04-02 PROCEDURE — 83550 IRON BINDING TEST: CPT

## 2024-04-02 PROCEDURE — 82728 ASSAY OF FERRITIN: CPT

## 2024-04-02 PROCEDURE — 80061 LIPID PANEL: CPT

## 2024-04-02 PROCEDURE — 80053 COMPREHEN METABOLIC PANEL: CPT

## 2024-04-02 PROCEDURE — 36415 COLL VENOUS BLD VENIPUNCTURE: CPT

## 2024-04-02 PROCEDURE — 85027 COMPLETE CBC AUTOMATED: CPT

## 2024-04-02 PROCEDURE — 83540 ASSAY OF IRON: CPT

## 2024-04-02 PROCEDURE — 83036 HEMOGLOBIN GLYCOSYLATED A1C: CPT

## 2024-04-02 RX ORDER — DIPHENHYDRAMINE HYDROCHLORIDE 50 MG/ML
50 INJECTION INTRAMUSCULAR; INTRAVENOUS AS NEEDED
Status: CANCELLED | OUTPATIENT
Start: 2024-04-03

## 2024-04-02 RX ORDER — FAMOTIDINE 10 MG/ML
20 INJECTION INTRAVENOUS ONCE AS NEEDED
Status: CANCELLED | OUTPATIENT
Start: 2024-04-03

## 2024-04-02 RX ORDER — ALBUTEROL SULFATE 0.83 MG/ML
3 SOLUTION RESPIRATORY (INHALATION) AS NEEDED
Status: CANCELLED | OUTPATIENT
Start: 2024-04-03

## 2024-04-02 RX ORDER — EPINEPHRINE 0.3 MG/.3ML
0.3 INJECTION SUBCUTANEOUS EVERY 5 MIN PRN
Status: CANCELLED | OUTPATIENT
Start: 2024-04-03

## 2024-04-12 ENCOUNTER — INFUSION (OUTPATIENT)
Dept: HEMATOLOGY/ONCOLOGY | Facility: CLINIC | Age: 49
End: 2024-04-12
Payer: COMMERCIAL

## 2024-04-12 VITALS
TEMPERATURE: 99.3 F | DIASTOLIC BLOOD PRESSURE: 87 MMHG | RESPIRATION RATE: 20 BRPM | HEART RATE: 90 BPM | SYSTOLIC BLOOD PRESSURE: 150 MMHG

## 2024-04-12 DIAGNOSIS — D50.0 IRON DEFICIENCY ANEMIA DUE TO CHRONIC BLOOD LOSS: ICD-10-CM

## 2024-04-12 PROCEDURE — 96366 THER/PROPH/DIAG IV INF ADDON: CPT | Mod: INF

## 2024-04-12 PROCEDURE — 2500000004 HC RX 250 GENERAL PHARMACY W/ HCPCS (ALT 636 FOR OP/ED): Performed by: PHYSICIAN ASSISTANT

## 2024-04-12 PROCEDURE — 96365 THER/PROPH/DIAG IV INF INIT: CPT | Mod: INF

## 2024-04-12 RX ORDER — ALBUTEROL SULFATE 0.83 MG/ML
3 SOLUTION RESPIRATORY (INHALATION) AS NEEDED
Status: CANCELLED | OUTPATIENT
Start: 2024-04-16

## 2024-04-12 RX ORDER — FAMOTIDINE 10 MG/ML
20 INJECTION INTRAVENOUS ONCE AS NEEDED
Status: CANCELLED | OUTPATIENT
Start: 2024-04-16

## 2024-04-12 RX ORDER — DIPHENHYDRAMINE HYDROCHLORIDE 50 MG/ML
50 INJECTION INTRAMUSCULAR; INTRAVENOUS AS NEEDED
Status: CANCELLED | OUTPATIENT
Start: 2024-04-16

## 2024-04-12 RX ORDER — EPINEPHRINE 0.3 MG/.3ML
0.3 INJECTION SUBCUTANEOUS EVERY 5 MIN PRN
Status: CANCELLED | OUTPATIENT
Start: 2024-04-16

## 2024-04-12 RX ADMIN — IRON SUCROSE 300 MG: 20 INJECTION, SOLUTION INTRAVENOUS at 13:45

## 2024-04-12 NOTE — PROGRESS NOTES
Venofer Lexicomp information offered and declined by patient as she had it 2 years ago.  Patient tolerated first Venofer infusion without sign of adverse reaction.  Observed 30 minutes post infusion.  To return on Monday.  Discharged in stable condition.

## 2024-04-15 ENCOUNTER — INFUSION (OUTPATIENT)
Dept: HEMATOLOGY/ONCOLOGY | Facility: CLINIC | Age: 49
End: 2024-04-15
Payer: COMMERCIAL

## 2024-04-15 VITALS
OXYGEN SATURATION: 98 % | HEART RATE: 86 BPM | BODY MASS INDEX: 32.44 KG/M2 | DIASTOLIC BLOOD PRESSURE: 70 MMHG | HEIGHT: 64 IN | WEIGHT: 190 LBS | RESPIRATION RATE: 20 BRPM | TEMPERATURE: 97.5 F | SYSTOLIC BLOOD PRESSURE: 152 MMHG

## 2024-04-15 DIAGNOSIS — D50.0 IRON DEFICIENCY ANEMIA DUE TO CHRONIC BLOOD LOSS: ICD-10-CM

## 2024-04-15 PROCEDURE — 96365 THER/PROPH/DIAG IV INF INIT: CPT | Mod: INF

## 2024-04-15 PROCEDURE — 96366 THER/PROPH/DIAG IV INF ADDON: CPT | Mod: INF

## 2024-04-15 PROCEDURE — 2500000004 HC RX 250 GENERAL PHARMACY W/ HCPCS (ALT 636 FOR OP/ED): Performed by: PHYSICIAN ASSISTANT

## 2024-04-15 RX ORDER — FAMOTIDINE 10 MG/ML
20 INJECTION INTRAVENOUS ONCE AS NEEDED
Status: CANCELLED | OUTPATIENT
Start: 2024-04-16

## 2024-04-15 RX ORDER — ALBUTEROL SULFATE 0.83 MG/ML
3 SOLUTION RESPIRATORY (INHALATION) AS NEEDED
Status: CANCELLED | OUTPATIENT
Start: 2024-04-16

## 2024-04-15 RX ORDER — EPINEPHRINE 0.3 MG/.3ML
0.3 INJECTION SUBCUTANEOUS EVERY 5 MIN PRN
Status: CANCELLED | OUTPATIENT
Start: 2024-04-16

## 2024-04-15 RX ORDER — DIPHENHYDRAMINE HYDROCHLORIDE 50 MG/ML
50 INJECTION INTRAMUSCULAR; INTRAVENOUS AS NEEDED
Status: CANCELLED | OUTPATIENT
Start: 2024-04-16

## 2024-04-15 RX ADMIN — IRON SUCROSE 300 MG: 20 INJECTION, SOLUTION INTRAVENOUS at 08:38

## 2024-04-15 ASSESSMENT — PAIN SCALES - GENERAL: PAINLEVEL: 0-NO PAIN

## 2024-04-17 ENCOUNTER — APPOINTMENT (OUTPATIENT)
Dept: OBSTETRICS AND GYNECOLOGY | Facility: CLINIC | Age: 49
End: 2024-04-17
Payer: COMMERCIAL

## 2024-04-18 ENCOUNTER — INFUSION (OUTPATIENT)
Dept: HEMATOLOGY/ONCOLOGY | Facility: CLINIC | Age: 49
End: 2024-04-18
Payer: COMMERCIAL

## 2024-04-18 VITALS
SYSTOLIC BLOOD PRESSURE: 147 MMHG | RESPIRATION RATE: 20 BRPM | HEIGHT: 64 IN | BODY MASS INDEX: 32.37 KG/M2 | TEMPERATURE: 98.1 F | WEIGHT: 189.6 LBS | OXYGEN SATURATION: 98 % | HEART RATE: 75 BPM | DIASTOLIC BLOOD PRESSURE: 82 MMHG

## 2024-04-18 DIAGNOSIS — D50.0 IRON DEFICIENCY ANEMIA DUE TO CHRONIC BLOOD LOSS: ICD-10-CM

## 2024-04-18 PROCEDURE — 96365 THER/PROPH/DIAG IV INF INIT: CPT | Mod: INF

## 2024-04-18 PROCEDURE — 96366 THER/PROPH/DIAG IV INF ADDON: CPT | Mod: INF

## 2024-04-18 PROCEDURE — 2500000004 HC RX 250 GENERAL PHARMACY W/ HCPCS (ALT 636 FOR OP/ED): Performed by: PHYSICIAN ASSISTANT

## 2024-04-18 RX ORDER — FAMOTIDINE 10 MG/ML
20 INJECTION INTRAVENOUS ONCE AS NEEDED
OUTPATIENT
Start: 2024-04-19

## 2024-04-18 RX ORDER — EPINEPHRINE 0.3 MG/.3ML
0.3 INJECTION SUBCUTANEOUS EVERY 5 MIN PRN
OUTPATIENT
Start: 2024-04-19

## 2024-04-18 RX ORDER — ALBUTEROL SULFATE 0.83 MG/ML
3 SOLUTION RESPIRATORY (INHALATION) AS NEEDED
OUTPATIENT
Start: 2024-04-19

## 2024-04-18 RX ORDER — DIPHENHYDRAMINE HYDROCHLORIDE 50 MG/ML
50 INJECTION INTRAMUSCULAR; INTRAVENOUS AS NEEDED
OUTPATIENT
Start: 2024-04-19

## 2024-04-18 RX ADMIN — IRON SUCROSE 300 MG: 20 INJECTION, SOLUTION INTRAVENOUS at 08:28

## 2024-04-18 ASSESSMENT — PAIN SCALES - GENERAL: PAINLEVEL: 0-NO PAIN

## 2024-04-18 NOTE — PROGRESS NOTES
Patient received Venofer 300 mg infusion (#3 of 3) without sign of adverse reaction.  To follow up with provider.  Discharged in stable condition.

## 2024-04-20 ENCOUNTER — APPOINTMENT (OUTPATIENT)
Dept: RADIOLOGY | Facility: HOSPITAL | Age: 49
End: 2024-04-20
Payer: COMMERCIAL

## 2024-04-20 ENCOUNTER — HOSPITAL ENCOUNTER (EMERGENCY)
Facility: HOSPITAL | Age: 49
Discharge: HOME | End: 2024-04-20
Attending: EMERGENCY MEDICINE
Payer: COMMERCIAL

## 2024-04-20 VITALS
SYSTOLIC BLOOD PRESSURE: 135 MMHG | BODY MASS INDEX: 32.44 KG/M2 | WEIGHT: 190 LBS | RESPIRATION RATE: 18 BRPM | HEIGHT: 64 IN | TEMPERATURE: 98.6 F | DIASTOLIC BLOOD PRESSURE: 72 MMHG | OXYGEN SATURATION: 99 % | HEART RATE: 76 BPM

## 2024-04-20 DIAGNOSIS — N30.01 ACUTE CYSTITIS WITH HEMATURIA: ICD-10-CM

## 2024-04-20 DIAGNOSIS — N92.1 METRORRHAGIA: Primary | ICD-10-CM

## 2024-04-20 DIAGNOSIS — D25.9 UTERINE LEIOMYOMA, UNSPECIFIED LOCATION: ICD-10-CM

## 2024-04-20 DIAGNOSIS — D64.9 CHRONIC ANEMIA: ICD-10-CM

## 2024-04-20 LAB
ABO GROUP (TYPE) IN BLOOD: NORMAL
ALBUMIN SERPL BCP-MCNC: 3.7 G/DL (ref 3.4–5)
ALP SERPL-CCNC: 34 U/L (ref 33–110)
ALT SERPL W P-5'-P-CCNC: 32 U/L (ref 7–45)
ANION GAP SERPL CALC-SCNC: 12 MMOL/L (ref 10–20)
ANTIBODY SCREEN: NORMAL
APPEARANCE UR: ABNORMAL
AST SERPL W P-5'-P-CCNC: 34 U/L (ref 9–39)
B-HCG SERPL-ACNC: <2 MIU/ML
BACTERIA #/AREA URNS AUTO: ABNORMAL /HPF
BASOPHILS # BLD AUTO: 0.03 X10*3/UL (ref 0–0.1)
BASOPHILS # BLD AUTO: 0.04 X10*3/UL (ref 0–0.1)
BASOPHILS NFR BLD AUTO: 0.3 %
BASOPHILS NFR BLD AUTO: 0.4 %
BILIRUB SERPL-MCNC: 0.3 MG/DL (ref 0–1.2)
BILIRUB UR STRIP.AUTO-MCNC: NEGATIVE MG/DL
BUN SERPL-MCNC: 14 MG/DL (ref 6–23)
CALCIUM SERPL-MCNC: 8.5 MG/DL (ref 8.6–10.3)
CHLORIDE SERPL-SCNC: 105 MMOL/L (ref 98–107)
CO2 SERPL-SCNC: 22 MMOL/L (ref 21–32)
COLOR UR: ABNORMAL
CREAT SERPL-MCNC: 1.08 MG/DL (ref 0.5–1.05)
EGFRCR SERPLBLD CKD-EPI 2021: 64 ML/MIN/1.73M*2
EOSINOPHIL # BLD AUTO: 0.29 X10*3/UL (ref 0–0.7)
EOSINOPHIL # BLD AUTO: 0.3 X10*3/UL (ref 0–0.7)
EOSINOPHIL NFR BLD AUTO: 2.7 %
EOSINOPHIL NFR BLD AUTO: 3 %
ERYTHROCYTE [DISTWIDTH] IN BLOOD BY AUTOMATED COUNT: 24.5 % (ref 11.5–14.5)
ERYTHROCYTE [DISTWIDTH] IN BLOOD BY AUTOMATED COUNT: 24.7 % (ref 11.5–14.5)
GLUCOSE SERPL-MCNC: 170 MG/DL (ref 74–99)
GLUCOSE UR STRIP.AUTO-MCNC: ABNORMAL MG/DL
HCT VFR BLD AUTO: 28.7 % (ref 36–46)
HCT VFR BLD AUTO: 32.1 % (ref 36–46)
HGB BLD-MCNC: 8.5 G/DL (ref 12–16)
HGB BLD-MCNC: 9.3 G/DL (ref 12–16)
HOLD SPECIMEN: NORMAL
HYPOCHROMIA BLD QL SMEAR: NORMAL
HYPOCHROMIA BLD QL SMEAR: NORMAL
IMM GRANULOCYTES # BLD AUTO: 0.04 X10*3/UL (ref 0–0.7)
IMM GRANULOCYTES # BLD AUTO: 0.05 X10*3/UL (ref 0–0.7)
IMM GRANULOCYTES NFR BLD AUTO: 0.4 % (ref 0–0.9)
IMM GRANULOCYTES NFR BLD AUTO: 0.5 % (ref 0–0.9)
INR PPP: 1 (ref 0.9–1.1)
KETONES UR STRIP.AUTO-MCNC: NEGATIVE MG/DL
LEUKOCYTE ESTERASE UR QL STRIP.AUTO: ABNORMAL
LYMPHOCYTES # BLD AUTO: 2.11 X10*3/UL (ref 1.2–4.8)
LYMPHOCYTES # BLD AUTO: 2.37 X10*3/UL (ref 1.2–4.8)
LYMPHOCYTES NFR BLD AUTO: 21.2 %
LYMPHOCYTES NFR BLD AUTO: 22.4 %
MCH RBC QN AUTO: 21.6 PG (ref 26–34)
MCH RBC QN AUTO: 22 PG (ref 26–34)
MCHC RBC AUTO-ENTMCNC: 29 G/DL (ref 32–36)
MCHC RBC AUTO-ENTMCNC: 29.6 G/DL (ref 32–36)
MCV RBC AUTO: 74 FL (ref 80–100)
MCV RBC AUTO: 75 FL (ref 80–100)
MONOCYTES # BLD AUTO: 0.33 X10*3/UL (ref 0.1–1)
MONOCYTES # BLD AUTO: 0.65 X10*3/UL (ref 0.1–1)
MONOCYTES NFR BLD AUTO: 3.3 %
MONOCYTES NFR BLD AUTO: 6.1 %
NEUTROPHILS # BLD AUTO: 7.12 X10*3/UL (ref 1.2–7.7)
NEUTROPHILS # BLD AUTO: 7.21 X10*3/UL (ref 1.2–7.7)
NEUTROPHILS NFR BLD AUTO: 68 %
NEUTROPHILS NFR BLD AUTO: 71.7 %
NITRITE UR QL STRIP.AUTO: NEGATIVE
NRBC BLD-RTO: 0 /100 WBCS (ref 0–0)
NRBC BLD-RTO: 0 /100 WBCS (ref 0–0)
PH UR STRIP.AUTO: 6 [PH]
PLATELET # BLD AUTO: 331 X10*3/UL (ref 150–450)
PLATELET # BLD AUTO: 357 X10*3/UL (ref 150–450)
POLYCHROMASIA BLD QL SMEAR: NORMAL
POLYCHROMASIA BLD QL SMEAR: NORMAL
POTASSIUM SERPL-SCNC: 3.4 MMOL/L (ref 3.5–5.3)
PROT SERPL-MCNC: 6.6 G/DL (ref 6.4–8.2)
PROT UR STRIP.AUTO-MCNC: ABNORMAL MG/DL
PROTHROMBIN TIME: 11.4 SECONDS (ref 9.8–12.8)
RBC # BLD AUTO: 3.87 X10*6/UL (ref 4–5.2)
RBC # BLD AUTO: 4.31 X10*6/UL (ref 4–5.2)
RBC # UR STRIP.AUTO: ABNORMAL /UL
RBC #/AREA URNS AUTO: >20 /HPF
RBC MORPH BLD: NORMAL
RBC MORPH BLD: NORMAL
RH FACTOR (ANTIGEN D): NORMAL
SODIUM SERPL-SCNC: 136 MMOL/L (ref 136–145)
SP GR UR STRIP.AUTO: 1.02
UROBILINOGEN UR STRIP.AUTO-MCNC: <2 MG/DL
WBC # BLD AUTO: 10.6 X10*3/UL (ref 4.4–11.3)
WBC # BLD AUTO: 9.9 X10*3/UL (ref 4.4–11.3)
WBC #/AREA URNS AUTO: ABNORMAL /HPF

## 2024-04-20 PROCEDURE — 85610 PROTHROMBIN TIME: CPT | Performed by: PHYSICIAN ASSISTANT

## 2024-04-20 PROCEDURE — 96376 TX/PRO/DX INJ SAME DRUG ADON: CPT

## 2024-04-20 PROCEDURE — 85025 COMPLETE CBC W/AUTO DIFF WBC: CPT | Performed by: PHYSICIAN ASSISTANT

## 2024-04-20 PROCEDURE — 84702 CHORIONIC GONADOTROPIN TEST: CPT | Performed by: PHYSICIAN ASSISTANT

## 2024-04-20 PROCEDURE — 36415 COLL VENOUS BLD VENIPUNCTURE: CPT | Performed by: PHYSICIAN ASSISTANT

## 2024-04-20 PROCEDURE — 99284 EMERGENCY DEPT VISIT MOD MDM: CPT | Mod: 25

## 2024-04-20 PROCEDURE — 86901 BLOOD TYPING SEROLOGIC RH(D): CPT | Performed by: PHYSICIAN ASSISTANT

## 2024-04-20 PROCEDURE — 96375 TX/PRO/DX INJ NEW DRUG ADDON: CPT

## 2024-04-20 PROCEDURE — 96365 THER/PROPH/DIAG IV INF INIT: CPT

## 2024-04-20 PROCEDURE — 96361 HYDRATE IV INFUSION ADD-ON: CPT

## 2024-04-20 PROCEDURE — 2500000006 HC RX 250 W HCPCS SELF ADMINISTERED DRUGS (ALT 637 FOR ALL PAYERS): Performed by: PHYSICIAN ASSISTANT

## 2024-04-20 PROCEDURE — 2500000004 HC RX 250 GENERAL PHARMACY W/ HCPCS (ALT 636 FOR OP/ED): Performed by: PHYSICIAN ASSISTANT

## 2024-04-20 PROCEDURE — 87086 URINE CULTURE/COLONY COUNT: CPT | Mod: ELYLAB | Performed by: PHYSICIAN ASSISTANT

## 2024-04-20 PROCEDURE — 76830 TRANSVAGINAL US NON-OB: CPT | Performed by: STUDENT IN AN ORGANIZED HEALTH CARE EDUCATION/TRAINING PROGRAM

## 2024-04-20 PROCEDURE — 80053 COMPREHEN METABOLIC PANEL: CPT | Performed by: PHYSICIAN ASSISTANT

## 2024-04-20 PROCEDURE — 81001 URINALYSIS AUTO W/SCOPE: CPT | Performed by: PHYSICIAN ASSISTANT

## 2024-04-20 PROCEDURE — 76856 US EXAM PELVIC COMPLETE: CPT

## 2024-04-20 PROCEDURE — 76857 US EXAM PELVIC LIMITED: CPT | Performed by: STUDENT IN AN ORGANIZED HEALTH CARE EDUCATION/TRAINING PROGRAM

## 2024-04-20 RX ORDER — MORPHINE SULFATE 4 MG/ML
4 INJECTION, SOLUTION INTRAMUSCULAR; INTRAVENOUS ONCE
Status: COMPLETED | OUTPATIENT
Start: 2024-04-20 | End: 2024-04-20

## 2024-04-20 RX ORDER — NAPROXEN SODIUM 550 MG/1
550 TABLET ORAL
Qty: 60 TABLET | Refills: 0 | Status: SHIPPED | OUTPATIENT
Start: 2024-04-20 | End: 2024-05-20

## 2024-04-20 RX ORDER — ONDANSETRON HYDROCHLORIDE 2 MG/ML
4 INJECTION, SOLUTION INTRAVENOUS ONCE
Status: COMPLETED | OUTPATIENT
Start: 2024-04-20 | End: 2024-04-20

## 2024-04-20 RX ORDER — KETOROLAC TROMETHAMINE 30 MG/ML
15 INJECTION, SOLUTION INTRAMUSCULAR; INTRAVENOUS ONCE
Status: COMPLETED | OUTPATIENT
Start: 2024-04-20 | End: 2024-04-20

## 2024-04-20 RX ORDER — CEPHALEXIN 500 MG/1
500 CAPSULE ORAL 4 TIMES DAILY
Qty: 28 CAPSULE | Refills: 0 | Status: SHIPPED | OUTPATIENT
Start: 2024-04-20 | End: 2024-04-27

## 2024-04-20 RX ORDER — CEFTRIAXONE 1 G/50ML
1 INJECTION, SOLUTION INTRAVENOUS ONCE
Status: COMPLETED | OUTPATIENT
Start: 2024-04-20 | End: 2024-04-20

## 2024-04-20 RX ORDER — POTASSIUM CHLORIDE 20 MEQ/1
40 TABLET, EXTENDED RELEASE ORAL ONCE
Status: COMPLETED | OUTPATIENT
Start: 2024-04-20 | End: 2024-04-20

## 2024-04-20 RX ADMIN — MORPHINE SULFATE 4 MG: 4 INJECTION, SOLUTION INTRAMUSCULAR; INTRAVENOUS at 22:13

## 2024-04-20 RX ADMIN — CEFTRIAXONE SODIUM 1 G: 1 INJECTION, SOLUTION INTRAVENOUS at 21:49

## 2024-04-20 RX ADMIN — KETOROLAC TROMETHAMINE 15 MG: 30 INJECTION, SOLUTION INTRAMUSCULAR at 21:45

## 2024-04-20 RX ADMIN — POTASSIUM CHLORIDE 40 MEQ: 1500 TABLET, EXTENDED RELEASE ORAL at 21:45

## 2024-04-20 RX ADMIN — MORPHINE SULFATE 4 MG: 4 INJECTION, SOLUTION INTRAMUSCULAR; INTRAVENOUS at 19:56

## 2024-04-20 RX ADMIN — SODIUM CHLORIDE, POTASSIUM CHLORIDE, SODIUM LACTATE AND CALCIUM CHLORIDE 1000 ML: 600; 310; 30; 20 INJECTION, SOLUTION INTRAVENOUS at 19:56

## 2024-04-20 RX ADMIN — ONDANSETRON 4 MG: 2 INJECTION INTRAMUSCULAR; INTRAVENOUS at 19:56

## 2024-04-20 ASSESSMENT — PAIN DESCRIPTION - LOCATION: LOCATION: ABDOMEN

## 2024-04-20 ASSESSMENT — PAIN SCALES - GENERAL
PAINLEVEL_OUTOF10: 8
PAINLEVEL_OUTOF10: 6
PAINLEVEL_OUTOF10: 6
PAINLEVEL_OUTOF10: 8
PAINLEVEL_OUTOF10: 7
PAINLEVEL_OUTOF10: 6
PAINLEVEL_OUTOF10: 3

## 2024-04-20 ASSESSMENT — PAIN DESCRIPTION - PAIN TYPE: TYPE: ACUTE PAIN

## 2024-04-20 ASSESSMENT — COLUMBIA-SUICIDE SEVERITY RATING SCALE - C-SSRS
2. HAVE YOU ACTUALLY HAD ANY THOUGHTS OF KILLING YOURSELF?: NO
6. HAVE YOU EVER DONE ANYTHING, STARTED TO DO ANYTHING, OR PREPARED TO DO ANYTHING TO END YOUR LIFE?: NO
1. IN THE PAST MONTH, HAVE YOU WISHED YOU WERE DEAD OR WISHED YOU COULD GO TO SLEEP AND NOT WAKE UP?: NO

## 2024-04-20 ASSESSMENT — LIFESTYLE VARIABLES
EVER HAD A DRINK FIRST THING IN THE MORNING TO STEADY YOUR NERVES TO GET RID OF A HANGOVER: NO
HAVE PEOPLE ANNOYED YOU BY CRITICIZING YOUR DRINKING: NO
TOTAL SCORE: 0
EVER FELT BAD OR GUILTY ABOUT YOUR DRINKING: NO
HAVE YOU EVER FELT YOU SHOULD CUT DOWN ON YOUR DRINKING: NO

## 2024-04-20 ASSESSMENT — PAIN DESCRIPTION - DESCRIPTORS: DESCRIPTORS: SHARP;STABBING

## 2024-04-20 ASSESSMENT — PAIN - FUNCTIONAL ASSESSMENT: PAIN_FUNCTIONAL_ASSESSMENT: 0-10

## 2024-04-20 ASSESSMENT — PAIN DESCRIPTION - ORIENTATION: ORIENTATION: LOWER

## 2024-04-20 NOTE — ED PROVIDER NOTES
HPI   Chief Complaint   Patient presents with    Vaginal Bleeding     Vaginal bleeding since November that has increased over the past three days. Large clots. Pt was supposed to see GYN last week but her DrLoly  Cancelled due to illness.        The patient is a pleasant 49-year-old female with a past medical history of menorrhagia receiving Venofer infusions presents emergency room with chief complaint of worsening pelvic cramping with heavy vaginal bleeding that started earlier today.  The patient states that she has been experiencing abnormal vaginal bleeding for the past few months.  Her primary care physician started her on hormonal replacement therapy and referred her to an OB/GYN.  She was supposed to see her OB/GYN last week but the appointment is canceled and she has not scheduled see her again till this week.  She is on levonorgestrel/ethinyl estradiol 0.1-20 mg-mcg 1 tablet daily.  She states that despite being prescribed this medication her vaginal bleeding has not improved.  She also has a past medical history of iron deficiency anemia.  She denies any recent fevers, chills, headache, chest pain, heart palpitations, cough, shortness of breath, nausea, vomiting, diarrhea.      History provided by:  Patient and medical records                      Osmel Coma Scale Score: 15                     Patient History   Past Medical History:   Diagnosis Date    Asthma ([facility] 1999     Past Surgical History:   Procedure Laterality Date     SECTION, LOW TRANSVERSE      TUBAL LIGATION       Family History   Problem Relation Name Age of Onset    Diabetes Mother Abi Crum     Heart disease Mother Abi Crum     Hypertension Mother Abi Crum     Kidney disease Mother Abi Crum     Stroke Mother Abi Crum     Blood clot Father Felipe crum     Cancer Father Felipe crum     Colon cancer Father Felipe crum     COPD Father Felipe crum     Hernia Father Felipe crum      Asthma Son Maninder singer     Heart disease Maternal Grandmother      No Known Problems Maternal Grandfather      No Known Problems Paternal Grandmother      No Known Problems Paternal Grandfather       Social History     Tobacco Use    Smoking status: Never    Smokeless tobacco: Never   Vaping Use    Vaping status: Never Used   Substance Use Topics    Alcohol use: Yes     Comment: occas, social    Drug use: Never       Physical Exam   ED Triage Vitals [04/20/24 1925]   Temperature Heart Rate Respirations BP   37 °C (98.6 °F) (!) 109 17 180/88      Pulse Ox Temp Source Heart Rate Source Patient Position   98 % Temporal Monitor Sitting      BP Location FiO2 (%)     Right arm --       Physical Exam  Vitals and nursing note reviewed. Exam conducted with a chaperone present.   Constitutional:       General: She is awake. She is not in acute distress.     Appearance: Normal appearance. She is well-developed, well-groomed and overweight. She is not ill-appearing, toxic-appearing or diaphoretic.   HENT:      Head: Normocephalic and atraumatic.      Right Ear: Tympanic membrane, ear canal and external ear normal.      Left Ear: Tympanic membrane, ear canal and external ear normal.      Nose: Nose normal.      Mouth/Throat:      Mouth: Mucous membranes are moist.      Pharynx: Oropharynx is clear.   Eyes:      Extraocular Movements: Extraocular movements intact.      Conjunctiva/sclera: Conjunctivae normal.      Pupils: Pupils are equal, round, and reactive to light.   Cardiovascular:      Rate and Rhythm: Normal rate and regular rhythm.      Pulses: Normal pulses.      Heart sounds: Normal heart sounds.   Pulmonary:      Effort: Pulmonary effort is normal.      Breath sounds: Normal breath sounds. No wheezing, rhonchi or rales.   Abdominal:      General: Abdomen is flat. Bowel sounds are normal.      Palpations: Abdomen is soft. There is no mass.      Tenderness: There is abdominal tenderness in the right lower quadrant,  suprapubic area and left lower quadrant. There is no guarding.       Musculoskeletal:         General: No swelling or tenderness. Normal range of motion.      Cervical back: Normal range of motion and neck supple.   Skin:     General: Skin is warm and dry.      Capillary Refill: Capillary refill takes less than 2 seconds.      Findings: No rash.   Neurological:      General: No focal deficit present.      Mental Status: She is alert and oriented to person, place, and time. Mental status is at baseline.   Psychiatric:         Mood and Affect: Mood normal.         Behavior: Behavior normal. Behavior is cooperative.         Thought Content: Thought content normal.         Judgment: Judgment normal.         ED Course & MDM   Diagnoses as of 04/20/24 2153   Metrorrhagia   Uterine leiomyoma, unspecified location   Chronic anemia   Acute cystitis with hematuria       Medical Decision Making  Temperature 37 heart rate 109, respirations 17, blood pressure 180/88, pulse ox is 98% on room air  I discussed the workup plan with the patient which include IV access lab work and pelvic ultrasound.  The patient gives consent.  She was given a liter of LR wide open, morphine 4 mg IV push, Zofran 4 mg IV push, and was placed on monitor for blood pressure and heart rate monitoring.    CBC white count 9.9 hemoglobin 9.3 hematocrit 32.1 platelet count 357, metabolic glucose 170 potassium 3.4.  Patient received 40 mEq of potassium orally.  Creatinine 1.08 GFR 64 calcium 8.5, patient is O+ with negative antibodies,  Pelvic ultrasound shows enlarged anteverted uterus measuring 13.6 x 8.2 x 3.6 cm, there is a hypoechoic uterine fibroid measuring 3 x 2.8 cm within the anterior uterine fundus, endometrial thickness is 5 mm.  No adnexal masses identified.  Urinalysis shows red color hazy appearance 100 protein 50 glucose large blood trace of leuks esterase white blood cells 6-10, red cells greater than 20, bacteria 4+.  Patient was given 1 g  Rocephin IV piggyback, 15 mg ketorolac IV push and we will repeat the patient's CBC to monitor the hemoglobin level.    2153 hrs. updated the patient on results of her ultrasound and lab work and urinalysis.  She is requesting something else for pain she was given 4 mg of morphine IV push.  Repeat CBC shows a hemoglobin of 8.5 hematocrit 20.7 which is consistent with previous lab values.  I discussed results of workup with the patient.  She was discharged home with prescription for Keflex, Anaprox and referred to OB/GYN on-call for follow-up.  She was advised to return with any concerns or worsening of symptoms.  Repeat vital signs /72 heart rate 80 respirations 18 pulse ox is 98% on room air.  Patient verbalized understand agreement the plan disposition all questions answered prior to discharge        Procedure  Procedures     Jass Rosado PA-C  04/20/24 9667

## 2024-04-21 LAB — HOLD SPECIMEN: NORMAL

## 2024-04-22 LAB — BACTERIA UR CULT: NORMAL

## 2024-04-25 ENCOUNTER — OFFICE VISIT (OUTPATIENT)
Dept: OBSTETRICS AND GYNECOLOGY | Facility: CLINIC | Age: 49
End: 2024-04-25
Payer: COMMERCIAL

## 2024-04-25 VITALS
DIASTOLIC BLOOD PRESSURE: 80 MMHG | BODY MASS INDEX: 34.13 KG/M2 | HEIGHT: 64 IN | WEIGHT: 199.9 LBS | SYSTOLIC BLOOD PRESSURE: 140 MMHG

## 2024-04-25 DIAGNOSIS — N93.9 ABNORMAL UTERINE BLEEDING (AUB): Primary | ICD-10-CM

## 2024-04-25 PROCEDURE — 99202 OFFICE O/P NEW SF 15 MIN: CPT | Performed by: ADVANCED PRACTICE MIDWIFE

## 2024-04-25 PROCEDURE — 1036F TOBACCO NON-USER: CPT | Performed by: ADVANCED PRACTICE MIDWIFE

## 2024-04-25 RX ORDER — MEDROXYPROGESTERONE ACETATE 10 MG/1
TABLET ORAL
Qty: 60 TABLET | Refills: 2 | Status: SHIPPED | OUTPATIENT
Start: 2024-04-25

## 2024-04-25 ASSESSMENT — ENCOUNTER SYMPTOMS
HEMATURIA: 0
DEPRESSION: 0
DIARRHEA: 0
WEIGHT LOSS: 0
DYSURIA: 0
VOMITING: 1
ANOREXIA: 1
MYALGIAS: 0
HEADACHES: 1
CONSTIPATION: 0
ARTHRALGIAS: 0
FREQUENCY: 0
FLATUS: 0
OCCASIONAL FEELINGS OF UNSTEADINESS: 0
FEVER: 0
HEMATOCHEZIA: 0
NAUSEA: 1
ABDOMINAL PAIN: 1
BELCHING: 0
LOSS OF SENSATION IN FEET: 0

## 2024-04-25 ASSESSMENT — PATIENT HEALTH QUESTIONNAIRE - PHQ9
1. LITTLE INTEREST OR PLEASURE IN DOING THINGS: NOT AT ALL
2. FEELING DOWN, DEPRESSED OR HOPELESS: NOT AT ALL
SUM OF ALL RESPONSES TO PHQ9 QUESTIONS 1 AND 2: 0

## 2024-04-25 NOTE — PROGRESS NOTES
"GYNECOLOGY PROGRESS NOTE          CC:   New patient visit. Patient noted a significant change in her menses 11/23. She started having very heavy bleeding with clots and soaking through her clothes. She has continued to have bleeding daily, even today. Bleeding started 11/23 and she was given OCPS about 1 month ago and has been taking them and she continues to have bleeding daily. Patient did have a sonogram done 4/20/24 that showed and enlarged uterus with a 3.0 cm fibroid noted. H&H 8.5/28.7  Chief Complaint   Patient presents with    New Patient Visit     New patient visit.   AUB. States has been bleeding since 11/2023 with no stop. Change in flow from heavy to light. Had an ultrasound done on 4/20/24.         HPI:  Jerilyn Guevara is here with new complaint of daily bleeding since 11/23.      ROS:  GYN - see HPI        PHYSICAL EXAM:  /80 (BP Location: Left arm, Patient Position: Sitting, BP Cuff Size: Adult)   Ht 1.626 m (5' 4\")   Wt 90.7 kg (199 lb 14.4 oz)   BMI 34.31 kg/m²   GEN:  A&O, NAD  HEENT:  head HC/AT, no visible goiter  PSYCH:  normal affect, non-anxious      IMPRESSION/PLAN:  A: AUB. Enlarged uterus with fibroid 3.0cm  Plan; 1. Stop OCPS. 2.Start provera 10mg every day and may increase to a maximum of 40mg every day. 3. Scheduled for hysteroscopic exam.   Education: Sonogram report. Possible causes of bleeding. Hysteroscopic exam, TXA, Mirena, Ablation, and hysterectomy.     Problem List Items Addressed This Visit    None        GIL Frazier-SHANTANU        "

## 2024-04-30 ENCOUNTER — APPOINTMENT (OUTPATIENT)
Dept: OBSTETRICS AND GYNECOLOGY | Facility: CLINIC | Age: 49
End: 2024-04-30
Payer: COMMERCIAL

## 2024-05-29 ENCOUNTER — HOSPITAL ENCOUNTER (OUTPATIENT)
Dept: RADIOLOGY | Facility: HOSPITAL | Age: 49
Discharge: HOME | End: 2024-05-29
Payer: COMMERCIAL

## 2024-05-29 ENCOUNTER — LAB (OUTPATIENT)
Dept: LAB | Facility: LAB | Age: 49
End: 2024-05-29
Payer: COMMERCIAL

## 2024-05-29 ENCOUNTER — PROCEDURE VISIT (OUTPATIENT)
Dept: OBSTETRICS AND GYNECOLOGY | Facility: CLINIC | Age: 49
End: 2024-05-29
Payer: COMMERCIAL

## 2024-05-29 VITALS
DIASTOLIC BLOOD PRESSURE: 60 MMHG | BODY MASS INDEX: 33.92 KG/M2 | HEIGHT: 65 IN | WEIGHT: 203.6 LBS | SYSTOLIC BLOOD PRESSURE: 116 MMHG

## 2024-05-29 VITALS — BODY MASS INDEX: 33.32 KG/M2 | HEIGHT: 65 IN | WEIGHT: 200 LBS

## 2024-05-29 DIAGNOSIS — Z12.31 ENCOUNTER FOR SCREENING MAMMOGRAM FOR BREAST CANCER: ICD-10-CM

## 2024-05-29 DIAGNOSIS — N93.9 ABNORMAL UTERINE BLEEDING (AUB): Primary | ICD-10-CM

## 2024-05-29 DIAGNOSIS — Z12.4 SCREENING FOR CERVICAL CANCER: ICD-10-CM

## 2024-05-29 DIAGNOSIS — D50.0 IRON DEFICIENCY ANEMIA DUE TO CHRONIC BLOOD LOSS: ICD-10-CM

## 2024-05-29 DIAGNOSIS — N84.0 ENDOMETRIAL POLYP: ICD-10-CM

## 2024-05-29 DIAGNOSIS — N94.6 DYSMENORRHEA: ICD-10-CM

## 2024-05-29 DIAGNOSIS — N93.9 ABNORMAL UTERINE BLEEDING (AUB): ICD-10-CM

## 2024-05-29 DIAGNOSIS — N80.03 ADENOMYOSIS: ICD-10-CM

## 2024-05-29 LAB
ERYTHROCYTE [DISTWIDTH] IN BLOOD BY AUTOMATED COUNT: 22.1 % (ref 11.5–14.5)
FERRITIN SERPL-MCNC: 44 NG/ML (ref 8–150)
HCT VFR BLD AUTO: 37.8 % (ref 36–46)
HGB BLD-MCNC: 11.3 G/DL (ref 12–16)
IRON SATN MFR SERPL: ABNORMAL %
IRON SERPL-MCNC: 41 UG/DL (ref 35–150)
MCH RBC QN AUTO: 24.3 PG (ref 26–34)
MCHC RBC AUTO-ENTMCNC: 29.9 G/DL (ref 32–36)
MCV RBC AUTO: 81 FL (ref 80–100)
NRBC BLD-RTO: 0 /100 WBCS (ref 0–0)
PLATELET # BLD AUTO: 317 X10*3/UL (ref 150–450)
PREGNANCY TEST URINE, POC: NEGATIVE
RBC # BLD AUTO: 4.65 X10*6/UL (ref 4–5.2)
TIBC SERPL-MCNC: ABNORMAL UG/DL
UIBC SERPL-MCNC: >450 UG/DL (ref 110–370)
WBC # BLD AUTO: 6.8 X10*3/UL (ref 4.4–11.3)

## 2024-05-29 PROCEDURE — 58558 HYSTEROSCOPY BIOPSY: CPT | Performed by: OBSTETRICS & GYNECOLOGY

## 2024-05-29 PROCEDURE — 77067 SCR MAMMO BI INCL CAD: CPT

## 2024-05-29 PROCEDURE — 77063 BREAST TOMOSYNTHESIS BI: CPT | Performed by: RADIOLOGY

## 2024-05-29 PROCEDURE — 81025 URINE PREGNANCY TEST: CPT | Performed by: OBSTETRICS & GYNECOLOGY

## 2024-05-29 PROCEDURE — 87624 HPV HI-RISK TYP POOLED RSLT: CPT

## 2024-05-29 PROCEDURE — 85027 COMPLETE CBC AUTOMATED: CPT

## 2024-05-29 PROCEDURE — 83550 IRON BINDING TEST: CPT

## 2024-05-29 PROCEDURE — 82728 ASSAY OF FERRITIN: CPT

## 2024-05-29 PROCEDURE — 88305 TISSUE EXAM BY PATHOLOGIST: CPT

## 2024-05-29 PROCEDURE — 36415 COLL VENOUS BLD VENIPUNCTURE: CPT

## 2024-05-29 PROCEDURE — 77067 SCR MAMMO BI INCL CAD: CPT | Performed by: RADIOLOGY

## 2024-05-29 PROCEDURE — 83540 ASSAY OF IRON: CPT

## 2024-05-29 NOTE — PROGRESS NOTES
Patient ID: Jerilyn Guevara is a 49 y.o. female for office hysteroscopy with endometrial sampling for AUB.  Started having heavier crampy her cycles in November.  For the last 4 months she has been having worsening cycles and there are more constant bleeding.  She has had 3 doses of IV iron due to anemia, she does not have any orders for a repeat CBC.  She previously was on OCPs by another provider, these were stopped and she was put on continuous Provera Rx by Emmanuelle Cai.  She is still bleeding some but it has lessened.  Today she is just spotting.  No Hx of HGSIL, ? Last pap 2 years ago at [facility] (no records).  BC=tubal ligation.  Surgeries include 2 C-sections the last with a tubal ligation, she also had a midline XLap emergently for a ruptured ectopic pregnancy and she had part of her right tube removed.      IMAGING RESULTS:    === 04/20/24 ===    US PELVIS TRANSABDOMINAL WITH TRANSVAGINAL    - Impression -  No acute sonographic abnormality. Enlarged fibroid uterus. No adnexal  mass was identified.    I personally reviewed the pelvic ultrasound images and my impressions are endometrium measured at 5 mm but indistinct due to shadowing from overlying adenomyosis and appears more heterogenous and thicker than 5 mm, ovaries not visualized, 3.0 cm area of focal lesion that appears more consistent with an adenomyoma rather than a fibroid, heterogenous myometrium consistent with adenomyosis, no free fluid      Procedures    HYSTEROSCOPY WITH EMB/POLYPECTOMY OFFICE PROCEDURE NOTE    Patient's pre-procedure questions were answered and a written informed consent form was signed.   Patient was placed in lithotomy position on the procedure room table.  A speculum was placed in the vagina. The cervix was cleansed × 3 with Betadine.  A single-tooth tenaculum was placed on the anterior lip of the cervix.  A paracervical block of 10 mL of 0.25% marcaine was placed.   The Tiny Printsl hysteroscope was then inserted into the  endometrial cavity, normal saline was used for the hysteroscopic fluid medium.  The cavity distended well with evidence of endometrial polyps 1 cm posterior lower uterine segment submucosal fibroid.  The background endometrium appeared proliferative with pitting changes at the fundus consistent with adenomyosis, there was no intracavitary involvement with the possible anterior fibroid.   The resectoscope was placed through the hysteroscope and the polyp(s) was then removed, as was background endometrial tissue. Procedure was then ended, the instruments removed from the uterus and vagina. The tenaculum site was hemostatic. The patient tolerated the procedure well.  Blood loss was minimal.      Problem List Items Addressed This Visit       Abnormal uterine bleeding (AUB) - Primary    Relevant Orders    POCT pregnancy, urine manually resulted (Completed)    CBC    Surgical Pathology Exam    Iron deficiency anemia due to chronic blood loss     Other Visit Diagnoses       Screening for cervical cancer        Relevant Orders    THINPREP PAP TEST    Adenomyosis        Dysmenorrhea        Endometrial polyp              AUB:   Discussed with the patient the potential etiologies  (PALMCOEIN) for her AUB.  Suspected diagnosis is antibiosis and endometrial polyps, possible uterine fibroid.  CBC with moderate anemia.  Pelvic US report and images reviewed--adenomyosis, suspect 3 cm adenomyoma rather than fibroid.  Failed OCPs.  Symptoms improved with Rx Provera..  Office hysteroscopy and EMB recommended for endometrial sampling.  Treatment options discussed including cyclic or continuous progesterones, Rx Lysteda, progesterone IUD, endometrial ablation, or definitive hysterectomy--treatment plan to follow once workup completed.  Hysteroscopy done today reveals evidence of endometrial polyps and a small submucosal fibroid and adenomyosis, polypectomy and myomectomy and endometrial sampling done today without complication,  postprocedure instructions reviewed.    Anemia:  Last HgB=8.5.  S/P IV FE x 3 doses.  Repeat CBC ordered today--office with call with lab results.    Pap/HPV done today as no records of last pap smear results (patient agreeable to screening), no Hx of HGSIL, next pap/HPV due in 5 years (2029).  ASCCP pap smear screening guidelines reviewed with the patient.       F/U in 2 to 3 weeks for virtual visit to discuss PATH results.      Edmar Carbajal, DO

## 2024-05-31 ENCOUNTER — HOSPITAL ENCOUNTER (OUTPATIENT)
Dept: RADIOLOGY | Facility: EXTERNAL LOCATION | Age: 49
Discharge: HOME | End: 2024-05-31
Payer: COMMERCIAL

## 2024-05-31 DIAGNOSIS — Z12.31 ENCOUNTER FOR SCREENING MAMMOGRAM FOR BREAST CANCER: ICD-10-CM

## 2024-06-05 ENCOUNTER — APPOINTMENT (OUTPATIENT)
Dept: GASTROENTEROLOGY | Facility: HOSPITAL | Age: 49
End: 2024-06-05
Payer: COMMERCIAL

## 2024-06-05 LAB
LABORATORY COMMENT REPORT: NORMAL
PATH REPORT.FINAL DX SPEC: NORMAL
PATH REPORT.GROSS SPEC: NORMAL
PATH REPORT.RELEVANT HX SPEC: NORMAL
PATH REPORT.TOTAL CANCER: NORMAL

## 2024-06-20 ENCOUNTER — APPOINTMENT (OUTPATIENT)
Dept: OBSTETRICS AND GYNECOLOGY | Facility: CLINIC | Age: 49
End: 2024-06-20
Payer: COMMERCIAL

## 2024-06-20 DIAGNOSIS — D21.9 FIBROID: ICD-10-CM

## 2024-06-20 DIAGNOSIS — N93.9 ABNORMAL UTERINE BLEEDING (AUB): Primary | ICD-10-CM

## 2024-06-20 PROCEDURE — 99214 OFFICE O/P EST MOD 30 MIN: CPT | Performed by: OBSTETRICS & GYNECOLOGY

## 2024-06-20 RX ORDER — MEGESTROL ACETATE 40 MG/1
40 TABLET ORAL 2 TIMES DAILY
Qty: 60 TABLET | Refills: 0 | Status: SHIPPED | OUTPATIENT
Start: 2024-06-20 | End: 2024-07-20

## 2024-06-20 NOTE — PROGRESS NOTES
GYNECOLOGY VIRTUAL VISIT PROGRESS NOTE          CC:     Chief Complaint   Patient presents with    Follow-up     EMB results        HPI:  Jerilyn Guevara is scheduled today for virtual visit to discuss test results.   Audio and Visual communication real-time were utilized and verbal consent was obtained for the encounter.    No new GYN complaints.   Did OK after office H/S procedure.   HMB post-procedure initially but resolved.  Still having some bleeding, but not longer heavy just persistent.        ROS:  GYN - see HPI      PHYSICAL EXAM:  VS:  n/a (virtual visit)  GEN:  A&O, NAD  PSYCH:  normal affect, non-anxious      IMPRESSION/PLAN:    Problem List Items Addressed This Visit       Abnormal uterine bleeding (AUB) - Primary    Relevant Medications    megestrol (Megace) 40 mg tablet     Other Visit Diagnoses       Fibroid              AUB:  Chronic HMB symptoms since 11/2023, symptoms persistent S/P hysteroscopic polypectomy.  Path results with benign endometrium and benign fibroid--reviewed with patient (polyp not identified by pathologist, but also clinically were polyps at office H/S).  Pap/HPV results wnl--discussed with the patient.  At this point time offered expectant management to see if periods normalize after polypectomy versus additional treatment.  Treatment options of progesterone Rx, progesterone IUD, endometrial ablation, or definitive hysterectomy discussed--patient elects for short-term course of more potent progesterone Rx and to see how her periods do and to see if additional treatment is needed in the future.  Declines additional treatment options as discussed for now.  AUB precautions reviewed.      F/U 1-2 months for virtual visit for recheck of AUB symptoms.      Edmar Carbajal,

## 2024-07-03 ENCOUNTER — APPOINTMENT (OUTPATIENT)
Dept: OBSTETRICS AND GYNECOLOGY | Facility: CLINIC | Age: 49
End: 2024-07-03
Payer: COMMERCIAL

## 2024-11-05 ENCOUNTER — TELEMEDICINE (OUTPATIENT)
Dept: OBSTETRICS AND GYNECOLOGY | Facility: CLINIC | Age: 49
End: 2024-11-05
Payer: COMMERCIAL

## 2024-11-05 DIAGNOSIS — N84.0 ENDOMETRIAL POLYP: ICD-10-CM

## 2024-11-05 DIAGNOSIS — N93.9 ABNORMAL UTERINE BLEEDING (AUB): Primary | ICD-10-CM

## 2024-11-05 DIAGNOSIS — D21.9 FIBROID: ICD-10-CM

## 2024-11-05 RX ORDER — TRANEXAMIC ACID 650 MG/1
1300 TABLET ORAL 3 TIMES DAILY
Qty: 30 TABLET | Refills: 1 | Status: SHIPPED | OUTPATIENT
Start: 2024-11-05 | End: 2024-11-10

## 2024-11-05 NOTE — PROGRESS NOTES
GYNECOLOGY VIRTUAL VISIT PROGRESS NOTE          CC:     Chief Complaint   Patient presents with    Vaginal Bleeding        HPI:  Jerilyn Guevara is scheduled today for virtual visit to discuss additional treatment options for her persistent AUB symptoms.   Audio and Visual communication real-time were utilized and verbal consent was obtained for the encounter.    States that her periods did okay after the office hysteroscopic polypectomy procedure in May.  For the last 2 to 3 months her periods have been heavy again and she is requesting additional treatment, cycles are regular.  BC=tubal ligation.      ROS:  GYN - see HPI      PHYSICAL EXAM:  VS:  n/a (virtual visit)  GEN:  A&O, NAD  PSYCH:  normal affect, non-anxious      LAB RESULTS:  Lab Results   Component Value Date    WBC 6.8 05/29/2024    HGB 11.3 (L) 05/29/2024    HCT 37.8 05/29/2024    MCV 81 05/29/2024     05/29/2024 5/29/24 - pap/HPV wnl      PATH RESULTS:  5/29/24 - EMB  -- Fragments of inactive endometrium with focally decidualized stroma, suggestive of exogenous progestin effect.  -- Fragments of smooth muscle, most suggestive of fragments of leiomyoma(ta).      IMAGING RESULTS:    04/20/24 - US PELVIS TRANSABDOMINAL WITH TRANSVAGINAL    - Impression -  No acute sonographic abnormality. Enlarged fibroid uterus. No adnexal  mass was identified.    **I personally reviewed the pelvic ultrasound images and my impressions are heterogenous uterus consistent with adenomyosis, 3 cm intramural fibroid.       IMPRESSION/PLAN:    Problem List Items Addressed This Visit       Abnormal uterine bleeding (AUB) - Primary    Relevant Medications    tranexamic acid (Lysteda) 650 mg tablet tablet     Other Visit Diagnoses       Endometrial polyp        Fibroid              AUB:   Recurrent.  S/P H/S lumpectomy 5/2024, path results show benign endometrium with fibroid.  US with 3 cm intramural fibroid and adenomyosis.  Suspected diagnosis is adenomyosis and/or  fibroid.     Treatment options include cyclic or continuous progesterones, Rx Lysteda, progesterone IUD, endometrial ablation, or definitive hysterectomy--patient requesting office endometrial ablation.    .AUB:   Recurrent.  S/P office H/S with EMB/polypectomy with benign PATH results (fibroid).  Treatment options for AUB discussed--including hormonal Rx or Mirena IUD, endometrial ablation (good office candidate, prior tubal ligation or vasectomy), or definitive hysterectomy.   Patient declines alternate treatments and elects for endometrial ablation.  Offered in-office procedure which is done with a paracervical block and can also be done with self-pay nitrous oxide ($100 self-pay cost discussed) vs in OR with general anesthesia--patient elects for office endometrial ablation procedure.  Will review risks of procedure and sign consent form in the office the day of the procedure.  Offered treatment with Lysteda in the interim, patient agreeable--Rx for Lysteda provided, use/AE reviewed, no contraindications to use.       Office will check insurance benefits for procedure and then contact patient to schedule ablation procedure.  If elects for nitrous oxide needs to notify office staff prior to scheduled procedure        Edmar Carbajal DO

## 2024-12-02 ENCOUNTER — APPOINTMENT (OUTPATIENT)
Dept: OBSTETRICS AND GYNECOLOGY | Facility: CLINIC | Age: 49
End: 2024-12-02
Payer: COMMERCIAL

## 2025-01-28 ENCOUNTER — APPOINTMENT (OUTPATIENT)
Dept: PRIMARY CARE | Facility: CLINIC | Age: 50
End: 2025-01-28
Payer: COMMERCIAL

## 2025-02-12 ENCOUNTER — APPOINTMENT (OUTPATIENT)
Dept: PRIMARY CARE | Facility: CLINIC | Age: 50
End: 2025-02-12
Payer: COMMERCIAL

## 2025-02-12 VITALS
HEIGHT: 65 IN | OXYGEN SATURATION: 100 % | WEIGHT: 198 LBS | BODY MASS INDEX: 32.99 KG/M2 | HEART RATE: 74 BPM | SYSTOLIC BLOOD PRESSURE: 118 MMHG | TEMPERATURE: 97.3 F | RESPIRATION RATE: 18 BRPM | DIASTOLIC BLOOD PRESSURE: 88 MMHG

## 2025-02-12 DIAGNOSIS — Z23 FLU VACCINE NEED: ICD-10-CM

## 2025-02-12 DIAGNOSIS — D50.0 IRON DEFICIENCY ANEMIA DUE TO CHRONIC BLOOD LOSS: ICD-10-CM

## 2025-02-12 DIAGNOSIS — R07.9 CHEST PAIN, UNSPECIFIED TYPE: Primary | ICD-10-CM

## 2025-02-12 PROCEDURE — 99214 OFFICE O/P EST MOD 30 MIN: CPT | Performed by: PHYSICIAN ASSISTANT

## 2025-02-12 PROCEDURE — 90656 IIV3 VACC NO PRSV 0.5 ML IM: CPT | Performed by: PHYSICIAN ASSISTANT

## 2025-02-12 PROCEDURE — 90471 IMMUNIZATION ADMIN: CPT | Performed by: PHYSICIAN ASSISTANT

## 2025-02-12 PROCEDURE — 3008F BODY MASS INDEX DOCD: CPT | Performed by: PHYSICIAN ASSISTANT

## 2025-02-12 PROCEDURE — 93000 ELECTROCARDIOGRAM COMPLETE: CPT | Performed by: PHYSICIAN ASSISTANT

## 2025-02-12 PROCEDURE — 1036F TOBACCO NON-USER: CPT | Performed by: PHYSICIAN ASSISTANT

## 2025-02-12 ASSESSMENT — ENCOUNTER SYMPTOMS
FATIGUE: 1
SHORTNESS OF BREATH: 1
LIGHT-HEADEDNESS: 1
DIAPHORESIS: 1
CHEST TIGHTNESS: 1
ABDOMINAL DISTENTION: 0
DIZZINESS: 1
FEVER: 0
APPETITE CHANGE: 1
VOMITING: 0
NAUSEA: 1

## 2025-02-12 NOTE — ASSESSMENT & PLAN NOTE
- Bleeding somewhat better since having polypectomy but still heavy at times. Plans to have endometrial ablation with GYN soon.   - Will check labs today to see if her fatigue and other sxs are due to recurrence of the anemia

## 2025-02-12 NOTE — PROGRESS NOTES
"Subjective   Patient ID: Jerilyn Guevara is a 49 y.o. female who presents for Follow-up (Pt here today for a follow up for iron deficiency and wants it rechecked. Symptoms include fatigue/tired and past 2 weeks her fingers/toes numb and tingling and past couple days SOB and can barely make it up stairs.  ) and Immunizations (Pt would like Flu vaccine-pending ).    HPI     Iron deficiency anemia follow up   - Has required iron infusions in the past   - was due to menorrhagia   - Saw OB for the menorrhagia and had polypectomy which helped for a while but heavy bleeding returned   - Insurance issue, now planning to schedule endometrial ablation with OBGYN   - Last iron infusion was months ago     - LMP period: 3 weeks ago  - Days of bleedin days  - Heavy bleeding: 3 days    - decreased appetite due to nausea  - really tired last month or so  - numbness/tingling in fingers/toes  - feeling dizzy/lightheaded/feels hot like she's about to pass out, sxs began in last couple days  - SOB for the past couple days  - endorses nausea, no vomiting  - endorses chest pain intermittently when she is feeling lightheaded  - CP extends down left arm  - CP and SOB began Friday  - CP is exertional when climbing up stairs, etc.   - FH: mom had MI and HTN        Review of Systems   Constitutional:  Positive for appetite change, diaphoresis and fatigue. Negative for fever.   HENT:  Negative for congestion.    Respiratory:  Positive for chest tightness and shortness of breath.    Gastrointestinal:  Positive for nausea. Negative for abdominal distention and vomiting.   Neurological:  Positive for dizziness and light-headedness. Negative for syncope.       Objective   /88   Pulse 74   Temp 36.3 °C (97.3 °F)   Resp 18   Ht 1.645 m (5' 4.75\")   Wt 89.8 kg (198 lb)   SpO2 100%   BMI 33.20 kg/m²     Physical Exam  Constitutional:       Appearance: Normal appearance.   Cardiovascular:      Rate and Rhythm: Normal rate and regular " rhythm.      Pulses: Normal pulses.      Heart sounds: Normal heart sounds. No murmur heard.  Pulmonary:      Effort: Pulmonary effort is normal.      Breath sounds: Normal breath sounds.   Musculoskeletal:      Right lower leg: No edema.      Left lower leg: No edema.   Neurological:      Mental Status: She is alert.   Psychiatric:         Mood and Affect: Mood and affect normal.         Assessment/Plan     Problem List Items Addressed This Visit       Iron deficiency anemia due to chronic blood loss    Overview     - Secondary to menorrhagia          Current Assessment & Plan     - Bleeding somewhat better since having polypectomy but still heavy at times. Plans to have endometrial ablation with GYN soon.   - Will check labs today to see if her fatigue and other sxs are due to recurrence of the anemia          Relevant Orders    CBC    Ferritin    Vitamin B12    Iron and TIBC     Other Visit Diagnoses       Chest pain, unspecified type    -  Primary    Relevant Orders    ECG 12 Lead    Stress Test    Flu vaccine need        Relevant Orders    Flu vaccine, trivalent, preservative free, age 6 months and greater (Fluarix/Fluzone/Flulaval) (Completed)            Chest pain and SOB   - EKG today normal sinus rhythm with no ST-Twave changes   - May be related to severe anemia which she has had in the past   - She does have family h/o MI (mom) so consider cardiac stress test if labs are normal       I was present with the PA student (Catina Jimenes from [employer]) who participated in the documentation of this note. I have personally seen and re-examined the patient and performed the medical decision-making components (assessment and plan of care). I have reviewed the PA student documentation and verified the findings in the note as written with additions or exceptions as stated in the body of this note.    Abena Payne PA-C

## 2025-02-13 ENCOUNTER — APPOINTMENT (OUTPATIENT)
Dept: RADIOLOGY | Facility: HOSPITAL | Age: 50
End: 2025-02-13
Payer: COMMERCIAL

## 2025-02-13 ENCOUNTER — APPOINTMENT (OUTPATIENT)
Dept: CARDIOLOGY | Facility: HOSPITAL | Age: 50
End: 2025-02-13
Payer: COMMERCIAL

## 2025-02-13 ENCOUNTER — HOSPITAL ENCOUNTER (EMERGENCY)
Facility: HOSPITAL | Age: 50
Discharge: HOME | End: 2025-02-13
Attending: EMERGENCY MEDICINE
Payer: COMMERCIAL

## 2025-02-13 VITALS
RESPIRATION RATE: 18 BRPM | WEIGHT: 190 LBS | HEIGHT: 64 IN | DIASTOLIC BLOOD PRESSURE: 62 MMHG | OXYGEN SATURATION: 100 % | TEMPERATURE: 98.2 F | HEART RATE: 84 BPM | BODY MASS INDEX: 32.44 KG/M2 | SYSTOLIC BLOOD PRESSURE: 128 MMHG

## 2025-02-13 DIAGNOSIS — D50.0 IRON DEFICIENCY ANEMIA DUE TO CHRONIC BLOOD LOSS: Primary | ICD-10-CM

## 2025-02-13 DIAGNOSIS — D50.9 MICROCYTIC ANEMIA: Primary | ICD-10-CM

## 2025-02-13 LAB
ABO GROUP (TYPE) IN BLOOD: NORMAL
ALBUMIN SERPL BCP-MCNC: 4.2 G/DL (ref 3.4–5)
ALP SERPL-CCNC: 47 U/L (ref 33–110)
ALT SERPL W P-5'-P-CCNC: 10 U/L (ref 7–45)
ANION GAP SERPL CALC-SCNC: 13 MMOL/L (ref 10–20)
ANTIBODY SCREEN: NORMAL
APTT PPP: 31 SECONDS (ref 27–38)
AST SERPL W P-5'-P-CCNC: 12 U/L (ref 9–39)
ATRIAL RATE: 88 BPM
BASOPHILS # BLD AUTO: 0.03 X10*3/UL (ref 0–0.1)
BASOPHILS NFR BLD AUTO: 0.4 %
BILIRUB SERPL-MCNC: 0.3 MG/DL (ref 0–1.2)
BUN SERPL-MCNC: 8 MG/DL (ref 6–23)
CALCIUM SERPL-MCNC: 8.9 MG/DL (ref 8.6–10.3)
CARDIAC TROPONIN I PNL SERPL HS: <3 NG/L (ref 0–13)
CARDIAC TROPONIN I PNL SERPL HS: <3 NG/L (ref 0–13)
CHLORIDE SERPL-SCNC: 105 MMOL/L (ref 98–107)
CO2 SERPL-SCNC: 22 MMOL/L (ref 21–32)
CREAT SERPL-MCNC: 0.69 MG/DL (ref 0.5–1.05)
D DIMER PPP FEU-MCNC: <215 NG/ML FEU
EGFRCR SERPLBLD CKD-EPI 2021: >90 ML/MIN/1.73M*2
EOSINOPHIL # BLD AUTO: 0.27 X10*3/UL (ref 0–0.7)
EOSINOPHIL NFR BLD AUTO: 3.7 %
ERYTHROCYTE [DISTWIDTH] IN BLOOD BY AUTOMATED COUNT: 16.4 % (ref 11–15)
ERYTHROCYTE [DISTWIDTH] IN BLOOD BY AUTOMATED COUNT: 17.1 % (ref 11.5–14.5)
FERRITIN SERPL-MCNC: 3 NG/ML (ref 16–232)
GLUCOSE SERPL-MCNC: 89 MG/DL (ref 74–99)
HCT VFR BLD AUTO: 27.5 % (ref 35–45)
HCT VFR BLD AUTO: 28.6 % (ref 36–46)
HGB BLD-MCNC: 8.2 G/DL (ref 11.7–15.5)
HGB BLD-MCNC: 8.3 G/DL (ref 12–16)
IMM GRANULOCYTES # BLD AUTO: 0.02 X10*3/UL (ref 0–0.7)
IMM GRANULOCYTES NFR BLD AUTO: 0.3 % (ref 0–0.9)
INR PPP: 0.9 (ref 0.9–1.1)
IRON SATN MFR SERPL: 3 % (CALC) (ref 16–45)
IRON SERPL-MCNC: 15 MCG/DL (ref 40–190)
LYMPHOCYTES # BLD AUTO: 1.62 X10*3/UL (ref 1.2–4.8)
LYMPHOCYTES NFR BLD AUTO: 22.4 %
MCH RBC QN AUTO: 21.7 PG (ref 26–34)
MCH RBC QN AUTO: 21.9 PG (ref 27–33)
MCHC RBC AUTO-ENTMCNC: 29 G/DL (ref 32–36)
MCHC RBC AUTO-ENTMCNC: 29.8 G/DL (ref 32–36)
MCV RBC AUTO: 73.5 FL (ref 80–100)
MCV RBC AUTO: 75 FL (ref 80–100)
MONOCYTES # BLD AUTO: 0.52 X10*3/UL (ref 0.1–1)
MONOCYTES NFR BLD AUTO: 7.2 %
NEUTROPHILS # BLD AUTO: 4.76 X10*3/UL (ref 1.2–7.7)
NEUTROPHILS NFR BLD AUTO: 66 %
NRBC BLD-RTO: 0 /100 WBCS (ref 0–0)
P AXIS: 7 DEGREES
P OFFSET: 178 MS
P ONSET: 131 MS
PLATELET # BLD AUTO: 249 X10*3/UL (ref 150–450)
PLATELET # BLD AUTO: 306 THOUSAND/UL (ref 140–400)
PMV BLD REES-ECKER: 10.6 FL (ref 7.5–12.5)
POTASSIUM SERPL-SCNC: 3.8 MMOL/L (ref 3.5–5.3)
PR INTERVAL: 174 MS
PROT SERPL-MCNC: 7.8 G/DL (ref 6.4–8.2)
PROTHROMBIN TIME: 10.6 SECONDS (ref 9.8–12.8)
Q ONSET: 218 MS
QRS COUNT: 15 BEATS
QRS DURATION: 72 MS
QT INTERVAL: 378 MS
QTC CALCULATION(BAZETT): 457 MS
QTC FREDERICIA: 429 MS
R AXIS: 22 DEGREES
RBC # BLD AUTO: 3.74 MILLION/UL (ref 3.8–5.1)
RBC # BLD AUTO: 3.83 X10*6/UL (ref 4–5.2)
RH FACTOR (ANTIGEN D): NORMAL
SODIUM SERPL-SCNC: 136 MMOL/L (ref 136–145)
T AXIS: 17 DEGREES
T OFFSET: 407 MS
TIBC SERPL-MCNC: 509 MCG/DL (CALC) (ref 250–450)
VENTRICULAR RATE: 88 BPM
VIT B12 SERPL-MCNC: 337 PG/ML (ref 200–1100)
WBC # BLD AUTO: 7.2 X10*3/UL (ref 4.4–11.3)
WBC # BLD AUTO: 7.9 THOUSAND/UL (ref 3.8–10.8)

## 2025-02-13 PROCEDURE — 70450 CT HEAD/BRAIN W/O DYE: CPT

## 2025-02-13 PROCEDURE — 99285 EMERGENCY DEPT VISIT HI MDM: CPT | Mod: 25 | Performed by: EMERGENCY MEDICINE

## 2025-02-13 PROCEDURE — 84484 ASSAY OF TROPONIN QUANT: CPT

## 2025-02-13 PROCEDURE — 86901 BLOOD TYPING SEROLOGIC RH(D): CPT

## 2025-02-13 PROCEDURE — 93005 ELECTROCARDIOGRAM TRACING: CPT

## 2025-02-13 PROCEDURE — 85379 FIBRIN DEGRADATION QUANT: CPT

## 2025-02-13 PROCEDURE — 85730 THROMBOPLASTIN TIME PARTIAL: CPT

## 2025-02-13 PROCEDURE — 71045 X-RAY EXAM CHEST 1 VIEW: CPT | Mod: FOREIGN READ | Performed by: RADIOLOGY

## 2025-02-13 PROCEDURE — 36415 COLL VENOUS BLD VENIPUNCTURE: CPT

## 2025-02-13 PROCEDURE — 71045 X-RAY EXAM CHEST 1 VIEW: CPT

## 2025-02-13 PROCEDURE — 84075 ASSAY ALKALINE PHOSPHATASE: CPT

## 2025-02-13 PROCEDURE — 85025 COMPLETE CBC W/AUTO DIFF WBC: CPT

## 2025-02-13 PROCEDURE — 70450 CT HEAD/BRAIN W/O DYE: CPT | Performed by: RADIOLOGY

## 2025-02-13 RX ORDER — ALBUTEROL SULFATE 0.83 MG/ML
3 SOLUTION RESPIRATORY (INHALATION) AS NEEDED
Status: DISCONTINUED | OUTPATIENT
Start: 2025-02-13 | End: 2025-02-18 | Stop reason: HOSPADM

## 2025-02-13 RX ORDER — DIPHENHYDRAMINE HYDROCHLORIDE 50 MG/ML
50 INJECTION INTRAMUSCULAR; INTRAVENOUS AS NEEDED
Status: DISCONTINUED | OUTPATIENT
Start: 2025-02-13 | End: 2025-02-18 | Stop reason: HOSPADM

## 2025-02-13 RX ORDER — ALBUTEROL SULFATE 0.83 MG/ML
3 SOLUTION RESPIRATORY (INHALATION) AS NEEDED
Status: CANCELLED | OUTPATIENT
Start: 2025-02-13

## 2025-02-13 RX ORDER — EPINEPHRINE 0.3 MG/.3ML
0.3 INJECTION SUBCUTANEOUS EVERY 5 MIN PRN
Status: CANCELLED | OUTPATIENT
Start: 2025-02-13

## 2025-02-13 RX ORDER — FAMOTIDINE 10 MG/ML
20 INJECTION, SOLUTION INTRAVENOUS ONCE AS NEEDED
Status: CANCELLED | OUTPATIENT
Start: 2025-02-13

## 2025-02-13 RX ORDER — EPINEPHRINE 0.3 MG/.3ML
0.3 INJECTION SUBCUTANEOUS EVERY 5 MIN PRN
Status: DISCONTINUED | OUTPATIENT
Start: 2025-02-13 | End: 2025-02-18 | Stop reason: HOSPADM

## 2025-02-13 RX ORDER — DIPHENHYDRAMINE HYDROCHLORIDE 50 MG/ML
50 INJECTION INTRAMUSCULAR; INTRAVENOUS AS NEEDED
Status: CANCELLED | OUTPATIENT
Start: 2025-02-13

## 2025-02-13 RX ORDER — FAMOTIDINE 10 MG/ML
20 INJECTION, SOLUTION INTRAVENOUS ONCE AS NEEDED
Status: DISCONTINUED | OUTPATIENT
Start: 2025-02-13 | End: 2025-02-18 | Stop reason: HOSPADM

## 2025-02-13 ASSESSMENT — COLUMBIA-SUICIDE SEVERITY RATING SCALE - C-SSRS
2. HAVE YOU ACTUALLY HAD ANY THOUGHTS OF KILLING YOURSELF?: NO
6. HAVE YOU EVER DONE ANYTHING, STARTED TO DO ANYTHING, OR PREPARED TO DO ANYTHING TO END YOUR LIFE?: NO
1. IN THE PAST MONTH, HAVE YOU WISHED YOU WERE DEAD OR WISHED YOU COULD GO TO SLEEP AND NOT WAKE UP?: NO
6. HAVE YOU EVER DONE ANYTHING, STARTED TO DO ANYTHING, OR PREPARED TO DO ANYTHING TO END YOUR LIFE?: YES

## 2025-02-13 ASSESSMENT — PAIN DESCRIPTION - LOCATION: LOCATION: ARM

## 2025-02-13 ASSESSMENT — PAIN - FUNCTIONAL ASSESSMENT: PAIN_FUNCTIONAL_ASSESSMENT: 0-10

## 2025-02-13 ASSESSMENT — PAIN SCALES - GENERAL: PAINLEVEL_OUTOF10: 8

## 2025-02-13 ASSESSMENT — PAIN DESCRIPTION - ORIENTATION: ORIENTATION: LEFT

## 2025-02-13 NOTE — ED PROVIDER NOTES
HPI   Chief Complaint   Patient presents with    Weakness, Gen     Pt c/o generalized weakness and left arm pain x 2 days. Pt states her doctor told her to come in because her iron is low    Shortness of Breath       EKG performed at 1521 interpreted by me normal sinus rhythm 88 beats minute.  History provided by: Patient    Limitations to history: None    CC: Weakness    HPI: 49-year-old female with a history of iron deficient anemia presents the emergency department to be evaluated for generalized weakness.  Patient states over the last week she has been feeling generally weak and rundown.  She reports exertional dyspnea.  She followed up with her primary care provider who performed outpatient blood work.  Her hemoglobin was 8.2 and her labs suggested substantial iron deficient anemia.  She has an established history of iron deficient anemia but does not tolerate oral iron supplement well so she does receive intermittent iron transfusions.  Her most recent transfusion was 6 to 8 months ago.  Patient was not scheduled for transfusion yesterday.  Patient's primary care provider called her today to see how she was feeling and the patient was saying that she was still feeling persistently weak they recommended that she come to the emergency department.  Patient denies chest pain.  Denies pleuritic pain and a mopped assist but denies any flulike symptoms.  She does have heavy intermittent vaginal bleeding due to uterine fibroids, she is scheduled have a hysterectomy done in 2 months.  They believe that the heavy vaginal bleeding and fibroids are the most likely cause to her iron deficient anemia.  Patient also states over the last few days she has had pain in her left arm with a tingling/falling asleep sensation.  Denies numbness.  Denies weakness in the extremity.  Denies skin color changes, bruising, redness, warmth, swelling.  Denies history of DVTs.  Denies recent plane flights, recent surgeries, history of cancer,  use of estrogen.  She denies history of CAD, she never required a stress test or catheterization.  Denies history of CHF or the use of diuretics but denies history of asthma or the use of breathing treatments.  Patient denies any injury to her arm and she is right arm dominant.  Denies history of arterial disease.  Denies all other systemic symptoms.    ROS: Negative unless mentioned in HPI    Medical Hx: Allergy to codeine.  Immunizations up-to-date.    Physical exam:    Constitutional: Patient is well-nourished and well-developed.  Sitting comfortably in the room and in no distress.  Oriented to person, place, time, and situation.    HEENT: Head is normocephalic, atraumatic. Patient's airway is patent.  Tympanic membranes are clear bilaterally.  Nasal mucosa clear.  Mouth with normal mucosa.  Throat is not erythematous and there are no oropharyngeal exudates, uvula is midline.  No obvious facial deformities.    Eyes: Clear bilaterally.  Pupils are equal round and reactive to light and accommodation.  Extraocular movements intact.      Cardiac: Regular rate, regular rhythm.  Heart sounds S1, S2.  No murmurs, rubs, or gallops.  PMI nondisplaced.  No JVD.    Respiratory: Regular respiratory rate and effort.  Breath sounds are clear and equal bilaterally, no adventitious lung sounds.  Patient is speaking in full sentences and is in no apparent respiratory distress. No use of accessory muscles.      Gastrointestinal: Abdomen is soft, nondistended, and nontender.  There are no obvious deformities.  No rebound tenderness or guarding.  Bowel sounds are normal active.    Genitourinary: No CVA or flank tenderness.    Musculoskeletal: No reproducible tenderness. Patient is extremities with appropriate temperature and color.  There is no swelling, redness, warmth, or bruising.  Examination of the patient's heart and lungs are unremarkable.  No obvious skin or bony deformities.  Patient has equal range of motion in all  extremities and no strength deficiencies.  No muscle or joint tenderness. No back or neck tenderness.  Capillary refill less than 3 seconds.  Strong peripheral pulses.  No sensory deficits.    Neurological: Patient is alert and oriented.  No focal deficits.  5/5 strength in all extremities.  Cranial nerves II through XII intact. GCS15.  NIH 0.    Skin: Skin is normal color for race and is warm, dry, and intact.  No evidence of trauma.  No lesions, rashes, bruising, jaundice, or masses.    Psych: Appropriate mood and affect.  No apparent risk to self or others.    Heme/lymph: No adenopathy, lymphadenopathy, or splenomegaly    Physical exam is otherwise negative unless stated above or in history of present illness.              Patient History   Past Medical History:   Diagnosis Date    Anemia     Asthma 1999     Past Surgical History:   Procedure Laterality Date     SECTION, LOW TRANSVERSE      x 2    ECTOPIC PREGNANCY SURGERY Right     midline XLap for partial RT salpingectomy (ruptured ectopic pregnancy)    TUBAL LIGATION       Family History   Problem Relation Name Age of Onset    Diabetes Mother Abi Crum     Heart disease Mother Abi Crum     Hypertension Mother Abicaleb Crum     Kidney disease Mother Abicaleb Crum     Stroke Mother Abi Crum     Blood clot Father Felipe crum     Cancer Father Felipe crum     Colon cancer Father Felipe crum     COPD Father Felipe wolf     Hernia Father Felipe crum     Asthma Son Maninder lucrecia     Heart disease Maternal Grandmother Abi     No Known Problems Maternal Grandfather      No Known Problems Paternal Grandmother      No Known Problems Paternal Grandfather       Social History     Tobacco Use    Smoking status: Never    Smokeless tobacco: Never   Vaping Use    Vaping status: Never Used   Substance Use Topics    Alcohol use: Yes     Comment: occas, social    Drug use: Never       Physical Exam   ED Triage Vitals [25  1446]   Temperature Heart Rate Respirations BP   36.8 °C (98.2 °F) 94 18 154/83      Pulse Ox Temp src Heart Rate Source Patient Position   99 % -- -- --      BP Location FiO2 (%)     -- --       Physical Exam      ED Course & MDM   Diagnoses as of 02/13/25 1706   Microcytic anemia        Patient updated on plan for lab testing, IV insertion, radiology imaging, and medications to be administered while in the ER (if indicated). Patient updated on expected wait times for testing and results. Patient provided my name and told to ask any staff member for questions or concerns if they should arise. Electronic medical record reviewed.     [facility]    Upon initial assessment, patient was healthy non-toxic appearing and in no apparent distress.     Patient presented to the emergency department with the chief complaint generalized weakness and shortness of breath.  Patient is extremities with appropriate temperature and color.  There is no swelling, redness, warmth, or bruising.  Examination of the patient's heart and lungs are unremarkable.  On arrival to the emergency department, vital signs were within normal limits    Patient's examination of her arm shows no findings of just obvious DVT or arterial pathology.  Patient's examination shows no findings that would suggest a stroke.  Will obtain basic blood work, EKG and troponin, chest x-ray, D-dimer, coagulation screen and type and screen.  Will also obtain a CT of the head.  I staffed this patient with my attending, agreeable plan of care.    Normal axis.  No ST elevation or depression.  No prolonged QT.  Nonspecific T wave version lead V1 and III.    Original repeat troponin within normal meds.  Heart score is 1 given her age.  D-dimer is negative making a PE unlikely.  Patient's coagulation screen does not show any findings of just the patient susceptibility bleeding.  CMP is unremarkable.  CBC reveals a microcytic anemia with a hemoglobin of 8.3, slightly improved compared  to yesterday.  Patient's x-ray reveals no sign of pneumonia or pneumothorax and CT of the head is unremarkable.  Ultimately I do believe the patient's symptoms are likely related to her iron deficient anemia.  She ultimately requires an iron transfusion.  She was able to ambulate without feeling short of breath or becoming hypoxic.  She does not require emergent hospitalization for symptomatic anemia or iron/blood transfusion.  Patient feels better and feels comfortable going home, she will call her primary care provider tomorrow who is already planning on setting her up with an outpatient iron transfusion.  I discussed supportive treatment including drinking plenty of fluids and resting.  All questions and concerns addressed.  Reasons to return to ER discussed.  Patient verbalized understanding and agreement with the treatment plan and they remained hemodynamically stable in the ER.    This note was dictated using a speech recognition program.  While an attempt was made at proof-reading to minimize errors, minor errors in transcription may be present         No data recorded                                 Medical Decision Making      Shared TESSIE Attestation:    I personally saw the patient and made/approved the management plan and take responsibility for the patient management.     History: 49-year-old female presents with weakness.    Exam: Regular rate and rhythm cardiac exam with clear breath sounds bilaterally.  Abdomen is soft and nontender.  Neurological exam is grossly intact.  NIH stroke scale is 0.  Negative Homans' sign bilaterally.    MDM: ACS, arrhythmia, electrolyte disorder, stroke    Labs Reviewed   CBC WITH AUTO DIFFERENTIAL - Abnormal       Result Value    WBC 7.2      nRBC 0.0      RBC 3.83 (*)     Hemoglobin 8.3 (*)     Hematocrit 28.6 (*)     MCV 75 (*)     MCH 21.7 (*)     MCHC 29.0 (*)     RDW 17.1 (*)     Platelets 249      Neutrophils % 66.0      Immature Granulocytes %, Automated 0.3       Lymphocytes % 22.4      Monocytes % 7.2      Eosinophils % 3.7      Basophils % 0.4      Neutrophils Absolute 4.76      Immature Granulocytes Absolute, Automated 0.02      Lymphocytes Absolute 1.62      Monocytes Absolute 0.52      Eosinophils Absolute 0.27      Basophils Absolute 0.03     COMPREHENSIVE METABOLIC PANEL - Normal    Glucose 89      Sodium 136      Potassium 3.8      Chloride 105      Bicarbonate 22      Anion Gap 13      Urea Nitrogen 8      Creatinine 0.69      eGFR >90      Calcium 8.9      Albumin 4.2      Alkaline Phosphatase 47      Total Protein 7.8      AST 12      Bilirubin, Total 0.3      ALT 10     COAGULATION SCREEN - Normal    Protime 10.6      INR 0.9      aPTT 31      Narrative:     The APTT is no longer used for monitoring Unfractionated Heparin Therapy. For monitoring Heparin Therapy, use the Heparin Assay.   D-DIMER, VTE EXCLUSION - Normal    D-Dimer, Quantitative VTE Exclusion <215      Narrative:     The VTE Exclusion D-Dimer assay is reported in ng/mL Fibrinogen Equivalent Units (FEU).    Per 's instructions for use, a value of less than 500 ng/mL (FEU) may help to exclude DVT or PE in outpatients when the assay is used with a clinical pretest probability assessment.([facility] must utilize and document eCalc 'Wells Score Deep Vein Thrombosis Risk' for DVT exclusion only. Emergency Department should utilize  Guidelines for Emergency Department Use of the VTE Exclusion D-Dimer and Clinical Pretest probability assessment model for DVT or PE exclusion.)   SERIAL TROPONIN-INITIAL - Normal    Troponin I, High Sensitivity <3      Narrative:     Less than 99th percentile of normal range cutoff-  Female and children under 18 years old <14 ng/L; Male <21 ng/L: Negative  Repeat testing should be performed if clinically indicated.     Female and children under 18 years old 14-50 ng/L; Male 21-50 ng/L:  Consistent with possible cardiac damage and possible increased clinical   risk.  Serial measurements may help to assess extent of myocardial damage.     >50 ng/L: Consistent with cardiac damage, increased clinical risk and  myocardial infarction. Serial measurements may help assess extent of   myocardial damage.      NOTE: Children less than 1 year old may have higher baseline troponin   levels and results should be interpreted in conjunction with the overall   clinical context.     NOTE: Troponin I testing is performed using a different   testing methodology at [facility] than at other   [facility]. Direct result comparisons should only   be made within the same method.   TYPE AND SCREEN    ABO TYPE O      Rh TYPE POS      ANTIBODY SCREEN NEG     TROPONIN SERIES- (INITIAL, 1 HR)    Narrative:     The following orders were created for panel order Troponin I Series, High Sensitivity (0, 1 HR).  Procedure                               Abnormality         Status                     ---------                               -----------         ------                     Troponin I, High Sensiti...[[id number]]  Normal              Final result               Troponin, High Sensitivi...[[id number]]                                                   Please view results for these tests on the individual orders.   SERIAL TROPONIN, 1 HOUR       CT head wo IV contrast   Final Result   No acute intracranial pathology.                  Signed by: Bowen Lipscomb 2/13/2025 3:36 PM   Dictation workstation:   [id number]      XR chest 1 view    (Results Pending)           Messi Song MD      Procedure  Procedures     Duane Li PA-C  02/13/25 170

## 2025-02-18 DIAGNOSIS — D50.0 IRON DEFICIENCY ANEMIA DUE TO CHRONIC BLOOD LOSS: Primary | ICD-10-CM

## 2025-02-20 ENCOUNTER — INFUSION (OUTPATIENT)
Dept: HEMATOLOGY/ONCOLOGY | Facility: CLINIC | Age: 50
End: 2025-02-20
Payer: COMMERCIAL

## 2025-02-20 VITALS
TEMPERATURE: 97.5 F | SYSTOLIC BLOOD PRESSURE: 137 MMHG | WEIGHT: 187 LBS | RESPIRATION RATE: 20 BRPM | BODY MASS INDEX: 31.92 KG/M2 | HEIGHT: 64 IN | OXYGEN SATURATION: 99 % | DIASTOLIC BLOOD PRESSURE: 80 MMHG | HEART RATE: 82 BPM

## 2025-02-20 DIAGNOSIS — D50.0 IRON DEFICIENCY ANEMIA DUE TO CHRONIC BLOOD LOSS: ICD-10-CM

## 2025-02-20 PROCEDURE — 2500000004 HC RX 250 GENERAL PHARMACY W/ HCPCS (ALT 636 FOR OP/ED): Performed by: PHYSICIAN ASSISTANT

## 2025-02-20 PROCEDURE — 96365 THER/PROPH/DIAG IV INF INIT: CPT | Mod: INF

## 2025-02-20 RX ORDER — FAMOTIDINE 10 MG/ML
20 INJECTION, SOLUTION INTRAVENOUS ONCE AS NEEDED
OUTPATIENT
Start: 2025-02-22

## 2025-02-20 RX ORDER — ALBUTEROL SULFATE 0.83 MG/ML
3 SOLUTION RESPIRATORY (INHALATION) AS NEEDED
OUTPATIENT
Start: 2025-02-22

## 2025-02-20 RX ORDER — EPINEPHRINE 0.3 MG/.3ML
0.3 INJECTION SUBCUTANEOUS EVERY 5 MIN PRN
OUTPATIENT
Start: 2025-02-22

## 2025-02-20 RX ORDER — DIPHENHYDRAMINE HYDROCHLORIDE 50 MG/ML
50 INJECTION INTRAMUSCULAR; INTRAVENOUS AS NEEDED
OUTPATIENT
Start: 2025-02-22

## 2025-02-20 RX ADMIN — IRON SUCROSE 300 MG: 20 INJECTION, SOLUTION INTRAVENOUS at 13:22

## 2025-02-20 ASSESSMENT — PAIN SCALES - GENERAL: PAINLEVEL_OUTOF10: 0-NO PAIN

## 2025-02-20 NOTE — PROGRESS NOTES
1520:  Patient received Venofer 300 mg infusion (#1/3) without sign of adverse reaction.  Observed 30 minutes post infusion.  To return in 1 week.  Discharged in stable condition.

## 2025-02-21 ENCOUNTER — APPOINTMENT (OUTPATIENT)
Dept: CARDIOLOGY | Facility: HOSPITAL | Age: 50
End: 2025-02-21
Payer: COMMERCIAL

## 2025-02-25 ENCOUNTER — INFUSION (OUTPATIENT)
Dept: HEMATOLOGY/ONCOLOGY | Facility: CLINIC | Age: 50
End: 2025-02-25
Payer: COMMERCIAL

## 2025-02-25 VITALS
BODY MASS INDEX: 32.44 KG/M2 | RESPIRATION RATE: 20 BRPM | TEMPERATURE: 99.1 F | DIASTOLIC BLOOD PRESSURE: 67 MMHG | HEIGHT: 64 IN | SYSTOLIC BLOOD PRESSURE: 138 MMHG | HEART RATE: 60 BPM | WEIGHT: 190 LBS | OXYGEN SATURATION: 98 %

## 2025-02-25 DIAGNOSIS — D50.0 IRON DEFICIENCY ANEMIA DUE TO CHRONIC BLOOD LOSS: ICD-10-CM

## 2025-02-25 PROCEDURE — 96365 THER/PROPH/DIAG IV INF INIT: CPT | Mod: INF

## 2025-02-25 PROCEDURE — 2500000004 HC RX 250 GENERAL PHARMACY W/ HCPCS (ALT 636 FOR OP/ED): Performed by: PHYSICIAN ASSISTANT

## 2025-02-25 RX ORDER — DIPHENHYDRAMINE HYDROCHLORIDE 50 MG/ML
50 INJECTION INTRAMUSCULAR; INTRAVENOUS AS NEEDED
Status: CANCELLED | OUTPATIENT
Start: 2025-02-28

## 2025-02-25 RX ORDER — ALBUTEROL SULFATE 0.83 MG/ML
3 SOLUTION RESPIRATORY (INHALATION) AS NEEDED
Status: CANCELLED | OUTPATIENT
Start: 2025-02-28

## 2025-02-25 RX ORDER — EPINEPHRINE 0.3 MG/.3ML
0.3 INJECTION SUBCUTANEOUS EVERY 5 MIN PRN
Status: CANCELLED | OUTPATIENT
Start: 2025-02-28

## 2025-02-25 RX ORDER — FAMOTIDINE 10 MG/ML
20 INJECTION, SOLUTION INTRAVENOUS ONCE AS NEEDED
Status: CANCELLED | OUTPATIENT
Start: 2025-02-28

## 2025-02-25 RX ADMIN — IRON SUCROSE 300 MG: 20 INJECTION, SOLUTION INTRAVENOUS at 13:35

## 2025-02-25 ASSESSMENT — PAIN SCALES - GENERAL: PAINLEVEL_OUTOF10: 0-NO PAIN

## 2025-02-25 NOTE — PROGRESS NOTES
1500:  Patient received Venofer 300 mg infusion (#1/3) without sign of adverse reaction.  Patient recently had Venofer infusions.  To return on Friday.  Discharged in stable condition.

## 2025-02-27 LAB
ATRIAL RATE: 88 BPM
P AXIS: 7 DEGREES
P OFFSET: 178 MS
P ONSET: 131 MS
PR INTERVAL: 174 MS
Q ONSET: 218 MS
QRS COUNT: 15 BEATS
QRS DURATION: 72 MS
QT INTERVAL: 378 MS
QTC CALCULATION(BAZETT): 457 MS
QTC FREDERICIA: 429 MS
R AXIS: 22 DEGREES
T AXIS: 17 DEGREES
T OFFSET: 407 MS
VENTRICULAR RATE: 88 BPM

## 2025-02-28 ENCOUNTER — INFUSION (OUTPATIENT)
Dept: HEMATOLOGY/ONCOLOGY | Facility: CLINIC | Age: 50
End: 2025-02-28
Payer: COMMERCIAL

## 2025-02-28 VITALS
RESPIRATION RATE: 20 BRPM | WEIGHT: 190 LBS | TEMPERATURE: 98.2 F | OXYGEN SATURATION: 100 % | HEART RATE: 74 BPM | DIASTOLIC BLOOD PRESSURE: 77 MMHG | HEIGHT: 64 IN | BODY MASS INDEX: 32.44 KG/M2 | SYSTOLIC BLOOD PRESSURE: 158 MMHG

## 2025-02-28 DIAGNOSIS — D50.0 IRON DEFICIENCY ANEMIA DUE TO CHRONIC BLOOD LOSS: ICD-10-CM

## 2025-02-28 PROCEDURE — 96366 THER/PROPH/DIAG IV INF ADDON: CPT | Mod: INF

## 2025-02-28 PROCEDURE — 96365 THER/PROPH/DIAG IV INF INIT: CPT | Mod: INF

## 2025-02-28 PROCEDURE — 2500000004 HC RX 250 GENERAL PHARMACY W/ HCPCS (ALT 636 FOR OP/ED): Performed by: PHYSICIAN ASSISTANT

## 2025-02-28 RX ORDER — ALBUTEROL SULFATE 0.83 MG/ML
3 SOLUTION RESPIRATORY (INHALATION) AS NEEDED
OUTPATIENT
Start: 2025-03-01

## 2025-02-28 RX ORDER — DIPHENHYDRAMINE HYDROCHLORIDE 50 MG/ML
50 INJECTION INTRAMUSCULAR; INTRAVENOUS AS NEEDED
OUTPATIENT
Start: 2025-03-01

## 2025-02-28 RX ORDER — EPINEPHRINE 0.3 MG/.3ML
0.3 INJECTION SUBCUTANEOUS EVERY 5 MIN PRN
OUTPATIENT
Start: 2025-03-01

## 2025-02-28 RX ORDER — FAMOTIDINE 10 MG/ML
20 INJECTION, SOLUTION INTRAVENOUS ONCE AS NEEDED
OUTPATIENT
Start: 2025-03-01

## 2025-02-28 RX ADMIN — IRON SUCROSE 300 MG: 20 INJECTION, SOLUTION INTRAVENOUS at 13:24

## 2025-02-28 ASSESSMENT — PAIN SCALES - GENERAL: PAINLEVEL_OUTOF10: 0-NO PAIN

## 2025-02-28 NOTE — PROGRESS NOTES
Patient received Venofer infusion without sign of adverse reaction.  Treatment complete.  Discharged in stable condition.

## 2025-09-12 ENCOUNTER — APPOINTMENT (OUTPATIENT)
Dept: PRIMARY CARE | Facility: CLINIC | Age: 50
End: 2025-09-12
Payer: COMMERCIAL